# Patient Record
Sex: FEMALE | Race: WHITE | URBAN - METROPOLITAN AREA
[De-identification: names, ages, dates, MRNs, and addresses within clinical notes are randomized per-mention and may not be internally consistent; named-entity substitution may affect disease eponyms.]

---

## 2023-11-17 RX ORDER — ATORVASTATIN CALCIUM 10 MG/1
5 TABLET, FILM COATED ORAL DAILY
COMMUNITY
Start: 2023-07-25 | End: 2023-11-17

## 2023-11-17 RX ORDER — LEVOTHYROXINE SODIUM 0.05 MG/1
50 TABLET ORAL DAILY
Qty: 90 TABLET | Refills: 0 | Status: SHIPPED | OUTPATIENT
Start: 2023-11-17

## 2023-11-17 RX ORDER — ATORVASTATIN CALCIUM 10 MG/1
5 TABLET, FILM COATED ORAL DAILY
Qty: 45 TABLET | Refills: 0 | Status: SHIPPED | OUTPATIENT
Start: 2023-11-17

## 2023-11-17 NOTE — TELEPHONE ENCOUNTER
[facility] updated    LOV/MAWV 12/06/22  Last labs 12/06/22  Last refill on 07/25/23, for #45 each, with 0 refills  atorvastatin 10 MG Oral Tab    Last refill on 07/28/23, for #90 each, with 1 refills  Levothyroxine Sodium 50 MCG Oral Tab     Future Appointments   Date Time Provider Chris Estrada   12/14/2023 11:15 AM Shant Bass MD [facility]     Order(s) pending, please review. Thank you.   Kathrine Campoverde

## 2023-11-17 NOTE — TELEPHONE ENCOUNTER
[facility] [address] , [phone], [address] [address] Phone: [phone] Fax: [phone] Hours: Not open 24 hours     PAKTIENT REQUESTING REFILL FOR   LEVOTHYROXINE 50MCG every day #90  ATORVASTATIN 10MG 1/2 TAB #90    PATIENT IS SCHEDULED FOR HER SUPER VISIT WITH DR Sharma [facility] IN [place] 2023

## 2023-12-14 ENCOUNTER — OFFICE VISIT (OUTPATIENT)
Dept: FAMILY MEDICINE CLINIC | Facility: CLINIC | Age: 78
End: 2023-12-14
Payer: COMMERCIAL

## 2023-12-14 VITALS
HEART RATE: 61 BPM | WEIGHT: 166 LBS | HEIGHT: 62 IN | OXYGEN SATURATION: 98 % | DIASTOLIC BLOOD PRESSURE: 76 MMHG | SYSTOLIC BLOOD PRESSURE: 120 MMHG | BODY MASS INDEX: 30.55 KG/M2 | TEMPERATURE: 98 F

## 2023-12-14 DIAGNOSIS — Z00.00 MEDICARE ANNUAL WELLNESS VISIT, SUBSEQUENT: Primary | ICD-10-CM

## 2023-12-14 DIAGNOSIS — Z12.31 ENCOUNTER FOR SCREENING MAMMOGRAM FOR HIGH-RISK PATIENT: ICD-10-CM

## 2023-12-14 DIAGNOSIS — Z78.0 MENOPAUSE: ICD-10-CM

## 2023-12-14 DIAGNOSIS — M65.342 TRIGGER RING FINGER OF LEFT HAND: ICD-10-CM

## 2023-12-14 DIAGNOSIS — E07.9 THYROID DISEASE: ICD-10-CM

## 2023-12-14 PROBLEM — M19.90 ARTHRITIS: Status: ACTIVE | Noted: 2020-05-12

## 2023-12-14 PROCEDURE — 3008F BODY MASS INDEX DOCD: CPT | Performed by: FAMILY MEDICINE

## 2023-12-14 PROCEDURE — 1159F MED LIST DOCD IN RCRD: CPT | Performed by: FAMILY MEDICINE

## 2023-12-14 PROCEDURE — 3078F DIAST BP <80 MM HG: CPT | Performed by: FAMILY MEDICINE

## 2023-12-14 PROCEDURE — 1170F FXNL STATUS ASSESSED: CPT | Performed by: FAMILY MEDICINE

## 2023-12-14 PROCEDURE — 3074F SYST BP LT 130 MM HG: CPT | Performed by: FAMILY MEDICINE

## 2023-12-14 PROCEDURE — 1160F RVW MEDS BY RX/DR IN RCRD: CPT | Performed by: FAMILY MEDICINE

## 2023-12-14 PROCEDURE — 96160 PT-FOCUSED HLTH RISK ASSMT: CPT | Performed by: FAMILY MEDICINE

## 2023-12-14 PROCEDURE — 1125F AMNT PAIN NOTED PAIN PRSNT: CPT | Performed by: FAMILY MEDICINE

## 2023-12-14 PROCEDURE — G0439 PPPS, SUBSEQ VISIT: HCPCS | Performed by: FAMILY MEDICINE

## 2024-01-16 ENCOUNTER — HOSPITAL ENCOUNTER (OUTPATIENT)
Dept: BONE DENSITY | Age: 79
Discharge: HOME | End: 2024-01-16
Attending: FAMILY MEDICINE
Payer: MEDICARE

## 2024-01-16 ENCOUNTER — TELEPHONE (OUTPATIENT)
Dept: FAMILY MEDICINE CLINIC | Facility: CLINIC | Age: 79
End: 2024-01-16

## 2024-01-16 DIAGNOSIS — S39.012A BACK STRAIN, INITIAL ENCOUNTER: Primary | ICD-10-CM

## 2024-01-16 DIAGNOSIS — Z78.0 MENOPAUSE: ICD-10-CM

## 2024-01-16 PROCEDURE — 77080 DXA BONE DENSITY AXIAL: CPT | Performed by: FAMILY MEDICINE

## 2024-01-16 RX ORDER — CYCLOBENZAPRINE HCL 5 MG
5 TABLET ORAL 3 TIMES DAILY PRN
Qty: 30 TABLET | Refills: 0 | Status: SHIPPED | OUTPATIENT
Start: 2024-01-16

## 2024-01-16 NOTE — TELEPHONE ENCOUNTER
Pt reports shoveling snow few days ago - lower back pain worse  Had 10/10 pain when getting up from exam table from bone scan today  Otherwise pain score 4-5/10 generally  Back is tender  Pt c/o Slight pain to right lower abd    Pt states Heat pad and OTC tylenol does help a little  Was advised to avoid ibuprofen - hx ulcers    Pt has appt tomorrow  Future Appointments   Date Time Provider Department Center   1/17/2024  1:40 PM Devan Cabral MD [facility]     Advised pt will notify Dr. Villanueva  If with further recommendations, will call pt back - she v/u  No further questions at this time    Please advise, thank you

## 2024-01-16 NOTE — TELEPHONE ENCOUNTER
Pt reports shoveling about 3-4 days ago and her back pain got worse after shoveling. Pt had trouble getting off the exam table this AM for bone density scan    Scheduled for IO appt tomorrow; ok to waity or should pt take something?

## 2024-01-16 NOTE — TELEPHONE ENCOUNTER
Sending in a muscle relaxer and if no better in a few days I can send in a steroid pack. Advise pt not to drive when taking this med. Thank you

## 2024-01-16 NOTE — TELEPHONE ENCOUNTER
Advised patient of Dr. Villanueva's note below. Patient verbalized understanding.   Advised pt if symptoms improved, may cancel appt tomorrow - she v/u. No further questions at this time.

## 2024-01-17 ENCOUNTER — TELEPHONE (OUTPATIENT)
Dept: FAMILY MEDICINE CLINIC | Facility: CLINIC | Age: 79
End: 2024-01-17

## 2024-01-17 NOTE — TELEPHONE ENCOUNTER
Letter mailed to patient reminding them they have outstanding orders.  Lab Frequency Next Occurrence   CBC W Differential W Platelet [E] Once 12/15/2023   Comp Metabolic Panel (14) [E] Once 12/15/2023   Lipid Panel [E] Once 12/15/2023   TSH and Free T4 [E] Once 12/15/2023

## 2024-02-05 ENCOUNTER — TELEPHONE (OUTPATIENT)
Dept: FAMILY MEDICINE CLINIC | Facility: CLINIC | Age: 79
End: 2024-02-05

## 2024-02-05 NOTE — TELEPHONE ENCOUNTER
Pt received a letter she is due for labs. Dr Villanueva changed her labs to June, can she still wait?

## 2024-02-05 NOTE — TELEPHONE ENCOUNTER
Advised patient of Dr. Villanueva's note below. Patient verbalized understanding. No further questions at this time.

## 2024-02-12 RX ORDER — LEVOTHYROXINE SODIUM 0.05 MG/1
50 TABLET ORAL DAILY
Qty: 30 TABLET | Refills: 0 | Status: SHIPPED | OUTPATIENT
Start: 2024-02-12

## 2024-02-12 NOTE — TELEPHONE ENCOUNTER
Thyroid Medication Protocol [id number]/12/2024 11:31 AM   Protocol Details TSH in past 12 months    Last TSH value is normal    In person appointment or virtual visit in the past 12 mos or appointment in next 3 mos      Routing to provider per protocol.   levothyroxine 50 MCG Oral Tab   Last refilled on 11/17/23 for #90  with 0 rf.   Last labs 12/14/23.   Last seen on for physical 12/14/23.       Future Appointments   Date Time Provider Department Center   2/22/2024 10:45 AM Quinten Cabrales MD [facility]   6/3/2024 11:00 AM [facility] NURSE [facility] [place]   6/3/2024 11:20 AM DELFINO HAY [facility]          Thank you.

## 2024-02-13 RX ORDER — LEVOTHYROXINE SODIUM 0.05 MG/1
50 TABLET ORAL DAILY
Qty: 90 TABLET | Refills: 0 | OUTPATIENT
Start: 2024-02-13

## 2024-02-15 NOTE — TELEPHONE ENCOUNTER
Pt failed refill protocol for the following reasons:   Name from pharmacy: ATORVASTATIN 10MG TABLETS         Will file in chart as: ATORVASTATIN 10 MG Oral Tab    Sig: TAKE 1/2 TABLET(5 MG) BY MOUTH DAILY    Disp: 45 tablet    Refills: 0 (Pharmacy requested: Not specified)    Start: 2/15/2024    Class: Normal    Non-formulary    Last ordered: 3 months ago (11/17/2023) by Devan Cabral MD    Last refill: 11/17/2023    Rx #: [id number]    Cholesterol Medication Protocol Yuvpdt62/15/2024 12:02 PM   Protocol Details ALT < 80    ALT resulted within past year    Lipid panel within past 12 months    In person appointment or virtual visit in the past 12 mos or appointment in next 3 mos      [place] be [facility] at: [id number] [place] [place] [facility] [address] [address] [facility] AT [facility] [address], [phone], [phone]         Last refill: 11/17/23  Last appt: 12/14/23  Next appt:   Future Appointments   Date Time Provider Department Center   2/22/2024 10:45 AM Quinten Cabrales MD [facility]   6/3/2024 11:00 AM RENATA MORE NURSE [facility] [place]   6/3/2024 11:20 AM DELFINO HAY [facility]         Forward to Dr. Villanueva, please advise on refills. Thank you.

## 2024-02-16 RX ORDER — ATORVASTATIN CALCIUM 10 MG/1
5 TABLET, FILM COATED ORAL DAILY
Qty: 45 TABLET | Refills: 0 | Status: SHIPPED | OUTPATIENT
Start: 2024-02-16

## 2024-02-22 ENCOUNTER — OFFICE VISIT (OUTPATIENT)
Dept: ORTHOPEDICS CLINIC | Facility: CLINIC | Age: 79
End: 2024-02-22
Payer: COMMERCIAL

## 2024-02-22 VITALS — BODY MASS INDEX: 30.55 KG/M2 | WEIGHT: 166 LBS | HEIGHT: 62 IN

## 2024-02-22 DIAGNOSIS — M65.342 TRIGGER RING FINGER OF LEFT HAND: Primary | ICD-10-CM

## 2024-02-22 PROCEDURE — 1159F MED LIST DOCD IN RCRD: CPT | Performed by: ORTHOPAEDIC SURGERY

## 2024-02-22 PROCEDURE — 99203 OFFICE O/P NEW LOW 30 MIN: CPT | Performed by: ORTHOPAEDIC SURGERY

## 2024-02-22 PROCEDURE — 3008F BODY MASS INDEX DOCD: CPT | Performed by: ORTHOPAEDIC SURGERY

## 2024-02-22 PROCEDURE — 1160F RVW MEDS BY RX/DR IN RCRD: CPT | Performed by: ORTHOPAEDIC SURGERY

## 2024-02-22 PROCEDURE — 1126F AMNT PAIN NOTED NONE PRSNT: CPT | Performed by: ORTHOPAEDIC SURGERY

## 2024-02-22 RX ORDER — BETAMETHASONE SODIUM PHOSPHATE AND BETAMETHASONE ACETATE 3; 3 MG/ML; MG/ML
6 INJECTION, SUSPENSION INTRA-ARTICULAR; INTRALESIONAL; INTRAMUSCULAR; SOFT TISSUE ONCE
Status: DISCONTINUED | OUTPATIENT
Start: 2024-02-22 | End: 2024-02-22

## 2024-02-22 NOTE — H&P
Clinic Note     Assessment/Plan:  78 year old with   Triggering of left ring finger.  I discussed with the patient various treatment options and their success rate/risks.  We using a shared decision-making process decided to proceed with a corticosteroid injection in 2 weeks from today's date. If after the corticosteroid injection the patient's symptoms are completely resolved, patient can cancel the appointment and follow-up on an as-needed basis.    Physical Exam:    Ht 5' 2\" (1.575 m)   Wt 166 lb (75.3 kg)   BMI 30.36 kg/m²     Constitutional: NAD. AOx3. Well-developed and Well-nourished.   Psychiatric: Normal mood/ affect/ behavior. Judgment and thought content normal.     Left upper Extremity:   Inspection: Skin Intact. No skin lesions. No gross deformity.   Palpation:  (+) TTP over A1 pulley   Motion: Elbow: normal bilateral symmetric ext/flex  Wrist: normal bilateral symmetric ext/flex/sup/pro  Finger: full composite fist,    Special Tests: (+) Slight triggering. (-) PIPJ contracture. Normally sensate digit. Normally perfused digit.       CC: Triggering of left ring finger    HPI: 78 year old right-hand-dominant female presents with triggering of left ring finger. It started 1 month ago. It has failed to improve/resolve. Pain level is none. Pain is described as locking. Patient has tried nothing.     (+) pain at A1 Pulley  (+) active triggering    Past Medical History:   Diagnosis Date    Anal stricture     Arthritis     Family history of colon cancer     Sister    GERD with esophagitis     Hiatal hernia     History of hemorrhoids     History of pneumonia     Hyperlipidemia     Hypothyroidism     Pruritus ani      Past Surgical History:   Procedure Laterality Date    ANAL SPHINCTEROTOMY  2002          x2    COLONOSCOPY  2002    COLONOSCOPY  2012    Mild anal stricture and hemorrhoids    COLONOSCOPY  2016    Normal colon    EGD  2016    Antral gastropathy with  erosive gastritis, mild GERD with esophagitis, large hiatal hernia    EGD  05/11/2016    Complete healing of the antrum, large hiatal hernia    FOOT SURGERY  2013    HYSTERECTOMY      REMOVAL OF OVARY(S) Bilateral      Current Outpatient Medications   Medication Sig Dispense Refill    atorvastatin 10 MG Oral Tab Take 0.5 tablets (5 mg total) by mouth daily. 45 tablet 0    levothyroxine 50 MCG Oral Tab Take 1 tablet (50 mcg total) by mouth daily. 30 tablet 0    IRON OR       Probiotic Product (PROBIOTIC DAILY OR) Take by mouth.      Clobetasol Propionate 0.05 % External Cream Apply sparingly to affected area BID  PRN 45 g 1    omega-3 fatty acids 1000 MG Oral Cap       nystatin 403880 UNIT/GM External Ointment MARGARITO EXT AA BID  1    Cholecalciferol (VITAMIN D3 SUPER STRENGTH) 2000 units Oral Tab       Omeprazole 20 MG Oral Tab EC Take 20 mg by mouth daily.        cyclobenzaprine 5 MG Oral Tab Take 1 tablet (5 mg total) by mouth 3 (three) times daily as needed for Muscle spasms. 30 tablet 0     Allergies   Allergen Reactions    Penicillin G HIVES    Penicillins HIVES     Family History   Problem Relation Age of Onset    Diabetes Other         maternal uncle    Uterine Cancer Mother 94    Colon Cancer Sister 42    Cancer Sister 30        Liver     Social History     Occupational History    Not on file   Tobacco Use    Smoking status: Never    Smokeless tobacco: Never   Vaping Use    Vaping Use: Never used   Substance and Sexual Activity    Alcohol use: No    Drug use: No    Sexual activity: Not on file        Review of Systems (negative unless bolded):  General: fevers, chills, fatigue  CV:  chest pain, palpitations, leg swelling  Msk: bodyaches, neck pain, neck stiffness  Skin: rashes, open wounds, nonhealing ulcers  Hem: bleeds easily, bruise easily, immunocompromised  Neuro: dizziness, light headedness, headaches  Psych: anxious, depressed, anger issues    Attention: This note has been scribed by Jenelle Allen under  the supervision of Edwardo Cabrales MD.     Edwardo Cabrales MD   Hand, Wrist, & Elbow Surgery  [email or web address]  t: [phone]  f: [phone]

## 2024-03-07 ENCOUNTER — OFFICE VISIT (OUTPATIENT)
Dept: ORTHOPEDICS CLINIC | Facility: CLINIC | Age: 79
End: 2024-03-07
Payer: COMMERCIAL

## 2024-03-07 VITALS — BODY MASS INDEX: 30.55 KG/M2 | HEIGHT: 62 IN | WEIGHT: 166 LBS

## 2024-03-07 DIAGNOSIS — M65.342 TRIGGER RING FINGER OF LEFT HAND: Primary | ICD-10-CM

## 2024-03-07 PROCEDURE — 20550 NJX 1 TENDON SHEATH/LIGAMENT: CPT | Performed by: ORTHOPAEDIC SURGERY

## 2024-03-07 PROCEDURE — 1159F MED LIST DOCD IN RCRD: CPT | Performed by: ORTHOPAEDIC SURGERY

## 2024-03-07 PROCEDURE — 3008F BODY MASS INDEX DOCD: CPT | Performed by: ORTHOPAEDIC SURGERY

## 2024-03-07 PROCEDURE — 1160F RVW MEDS BY RX/DR IN RCRD: CPT | Performed by: ORTHOPAEDIC SURGERY

## 2024-03-07 PROCEDURE — 1126F AMNT PAIN NOTED NONE PRSNT: CPT | Performed by: ORTHOPAEDIC SURGERY

## 2024-03-07 RX ORDER — BETAMETHASONE SODIUM PHOSPHATE AND BETAMETHASONE ACETATE 3; 3 MG/ML; MG/ML
6 INJECTION, SUSPENSION INTRA-ARTICULAR; INTRALESIONAL; INTRAMUSCULAR; SOFT TISSUE ONCE
Status: COMPLETED | OUTPATIENT
Start: 2024-03-07 | End: 2024-03-07

## 2024-03-07 RX ADMIN — BETAMETHASONE SODIUM PHOSPHATE AND BETAMETHASONE ACETATE 6 MG: 3; 3 INJECTION, SUSPENSION INTRA-ARTICULAR; INTRALESIONAL; INTRAMUSCULAR; SOFT TISSUE at 10:57:00

## 2024-03-07 NOTE — PROGRESS NOTES
Injection:    Written consent was obtained.  The skin was prepped with alcohol.  Ethyl chloride spray was used anesthetize the superficial skin.  A 25-gauge needle was used to inject 1.5 mL mixture of 1 mL of 6 mg of betamethasone and 1 mL of 1% lidocaine into left  ring finger A1 pulley.  Hemostasis achieved.  Band-Aid was applied.  Patient tolerated procedure without complication.

## 2024-03-18 ENCOUNTER — TELEPHONE (OUTPATIENT)
Dept: FAMILY MEDICINE CLINIC | Facility: CLINIC | Age: 79
End: 2024-03-18

## 2024-03-18 RX ORDER — LEVOTHYROXINE SODIUM 0.05 MG/1
50 TABLET ORAL DAILY
Qty: 90 TABLET | Refills: 0 | Status: SHIPPED | OUTPATIENT
Start: 2024-03-18

## 2024-03-18 NOTE — TELEPHONE ENCOUNTER
Last OV:12/14/2023  Last refill:02/12/2024 30 tabs, 0 refill     Last TSH 12/06/2022    Medication pended, please sign if appropriate

## 2024-03-18 NOTE — TELEPHONE ENCOUNTER
Pt states she normally gets 90 day prescription. Last time she only got 30 days so doesn't want a \"refill\".  States she wants 90 days because of insurance.    levothyroxine 50 MCG Oral Tab [[id number]] (Order [id number]     Please send to:  [id number] - [place], [place] - [address] AT [place] [address], [phone], [phone] [address] [facility] [address]   Phone: [phone] Fax: [phone]   Thank you!

## 2024-04-25 ENCOUNTER — MED REC SCAN ONLY (OUTPATIENT)
Dept: FAMILY MEDICINE CLINIC | Facility: CLINIC | Age: 79
End: 2024-04-25

## 2024-06-03 ENCOUNTER — NURSE ONLY (OUTPATIENT)
Dept: FAMILY MEDICINE CLINIC | Facility: CLINIC | Age: 79
End: 2024-06-03
Payer: COMMERCIAL

## 2024-06-03 ENCOUNTER — HOSPITAL ENCOUNTER (OUTPATIENT)
Dept: MAMMOGRAPHY | Age: 79
Discharge: HOME | End: 2024-06-03
Attending: FAMILY MEDICINE
Payer: MEDICARE

## 2024-06-03 DIAGNOSIS — Z12.31 ENCOUNTER FOR SCREENING MAMMOGRAM FOR HIGH-RISK PATIENT: ICD-10-CM

## 2024-06-03 DIAGNOSIS — Z00.00 MEDICARE ANNUAL WELLNESS VISIT, SUBSEQUENT: ICD-10-CM

## 2024-06-03 DIAGNOSIS — E07.9 THYROID DISEASE: ICD-10-CM

## 2024-06-03 LAB
ALBUMIN SERPL-MCNC: 3.8 G/DL (ref 3.4–5)
ALBUMIN/GLOB SERPL: 1.1 {RATIO} (ref 1–2)
ALP LIVER SERPL-CCNC: 116 U/L
ALT SERPL-CCNC: 18 U/L
ANION GAP SERPL CALC-SCNC: 6 MMOL/L (ref 0–18)
AST SERPL-CCNC: 19 U/L (ref 15–37)
BASOPHILS # BLD AUTO: 0.06 X10(3) UL (ref 0–0.2)
BASOPHILS NFR BLD AUTO: 1.1 %
BILIRUB SERPL-MCNC: 0.8 MG/DL (ref 0.1–2)
BUN BLD-MCNC: 11 MG/DL (ref 9–23)
CALCIUM BLD-MCNC: 9.5 MG/DL (ref 8.5–10.1)
CHLORIDE SERPL-SCNC: 108 MMOL/L (ref 98–112)
CHOLEST SERPL-MCNC: 182 MG/DL (ref ?–200)
CO2 SERPL-SCNC: 26 MMOL/L (ref 21–32)
CREAT BLD-MCNC: 0.87 MG/DL
EGFRCR SERPLBLD CKD-EPI 2021: 69 ML/MIN/1.73M2 (ref 60–?)
EOSINOPHIL # BLD AUTO: 0.11 X10(3) UL (ref 0–0.7)
EOSINOPHIL NFR BLD AUTO: 1.9 %
ERYTHROCYTE [DISTWIDTH] IN BLOOD BY AUTOMATED COUNT: 13.3 %
FASTING PATIENT LIPID ANSWER: YES
FASTING STATUS PATIENT QL REPORTED: YES
GLOBULIN PLAS-MCNC: 3.4 G/DL (ref 2.8–4.4)
GLUCOSE BLD-MCNC: 93 MG/DL (ref 70–99)
HCT VFR BLD AUTO: 42.9 %
HDLC SERPL-MCNC: 53 MG/DL (ref 40–59)
HGB BLD-MCNC: 14.1 G/DL
IMM GRANULOCYTES # BLD AUTO: 0.01 X10(3) UL (ref 0–1)
IMM GRANULOCYTES NFR BLD: 0.2 %
LDLC SERPL CALC-MCNC: 107 MG/DL (ref ?–100)
LYMPHOCYTES # BLD AUTO: 1.36 X10(3) UL (ref 1–4)
LYMPHOCYTES NFR BLD AUTO: 24 %
MCH RBC QN AUTO: 31.3 PG (ref 26–34)
MCHC RBC AUTO-ENTMCNC: 32.9 G/DL (ref 31–37)
MCV RBC AUTO: 95.1 FL
MONOCYTES # BLD AUTO: 0.5 X10(3) UL (ref 0.1–1)
MONOCYTES NFR BLD AUTO: 8.8 %
NEUTROPHILS # BLD AUTO: 3.62 X10 (3) UL (ref 1.5–7.7)
NEUTROPHILS # BLD AUTO: 3.62 X10(3) UL (ref 1.5–7.7)
NEUTROPHILS NFR BLD AUTO: 64 %
NONHDLC SERPL-MCNC: 129 MG/DL (ref ?–130)
OSMOLALITY SERPL CALC.SUM OF ELEC: 289 MOSM/KG (ref 275–295)
PLATELET # BLD AUTO: 211 10(3)UL (ref 150–450)
POTASSIUM SERPL-SCNC: 4.3 MMOL/L (ref 3.5–5.1)
PROT SERPL-MCNC: 7.2 G/DL (ref 6.4–8.2)
RBC # BLD AUTO: 4.51 X10(6)UL
SODIUM SERPL-SCNC: 140 MMOL/L (ref 136–145)
T4 FREE SERPL-MCNC: 1 NG/DL (ref 0.8–1.7)
TRIGL SERPL-MCNC: 123 MG/DL (ref 30–149)
TSI SER-ACNC: 1.64 MIU/ML (ref 0.36–3.74)
VLDLC SERPL CALC-MCNC: 21 MG/DL (ref 0–30)
WBC # BLD AUTO: 5.7 X10(3) UL (ref 4–11)

## 2024-06-03 PROCEDURE — 77067 SCR MAMMO BI INCL CAD: CPT | Performed by: FAMILY MEDICINE

## 2024-06-03 PROCEDURE — 84439 ASSAY OF FREE THYROXINE: CPT | Performed by: FAMILY MEDICINE

## 2024-06-03 PROCEDURE — 85025 COMPLETE CBC W/AUTO DIFF WBC: CPT | Performed by: FAMILY MEDICINE

## 2024-06-03 PROCEDURE — 77063 BREAST TOMOSYNTHESIS BI: CPT | Performed by: FAMILY MEDICINE

## 2024-06-03 PROCEDURE — 80061 LIPID PANEL: CPT | Performed by: FAMILY MEDICINE

## 2024-06-03 PROCEDURE — 84443 ASSAY THYROID STIM HORMONE: CPT | Performed by: FAMILY MEDICINE

## 2024-06-03 PROCEDURE — 80053 COMPREHEN METABOLIC PANEL: CPT | Performed by: FAMILY MEDICINE

## 2024-06-03 NOTE — PROGRESS NOTES
Samantha Mary present in office for nurse visit.  Labs as ordered by Dr. Villanueva; 22 g, Right AC, lavender tube, and green tube     All questions/concerns addressed. Patient left in stable condition.

## 2024-06-07 RX ORDER — LEVOTHYROXINE SODIUM 0.05 MG/1
50 TABLET ORAL DAILY
Qty: 90 TABLET | Refills: 0 | Status: SHIPPED | OUTPATIENT
Start: 2024-06-07

## 2024-06-07 NOTE — TELEPHONE ENCOUNTER
Thyroid Medication Protocol Fxeeza5606/07/2024 01:04 PM   Protocol Details TSH in past 12 months    Last TSH value is normal    In person appointment or virtual visit in the past 12 mos or appointment in next 3 mos      Refilled per protocol  levothyroxine 50 MCG Oral Tab   Last refilled on 3/18/24 #90 with 0 rf.  LOV- 12/14/23  Last labs- 6/3/24    Sent to pharmacy

## 2024-06-18 ENCOUNTER — HOSPITAL ENCOUNTER (OUTPATIENT)
Dept: GENERAL RADIOLOGY | Age: 79
Discharge: HOME | End: 2024-06-18
Attending: FAMILY MEDICINE

## 2024-06-18 ENCOUNTER — OFFICE VISIT (OUTPATIENT)
Dept: FAMILY MEDICINE CLINIC | Facility: CLINIC | Age: 79
End: 2024-06-18

## 2024-06-18 VITALS
DIASTOLIC BLOOD PRESSURE: 82 MMHG | RESPIRATION RATE: 22 BRPM | OXYGEN SATURATION: 98 % | BODY MASS INDEX: 31 KG/M2 | SYSTOLIC BLOOD PRESSURE: 128 MMHG | WEIGHT: 169.19 LBS | TEMPERATURE: 98 F | HEART RATE: 101 BPM

## 2024-06-18 DIAGNOSIS — R20.2 PARESTHESIA OF SKIN: ICD-10-CM

## 2024-06-18 DIAGNOSIS — S63.633A SPRAIN OF INTERPHALANGEAL JOINT OF LEFT MIDDLE FINGER, INITIAL ENCOUNTER: ICD-10-CM

## 2024-06-18 DIAGNOSIS — K13.0 LIP LESION: ICD-10-CM

## 2024-06-18 DIAGNOSIS — M54.2 NECK PAIN: ICD-10-CM

## 2024-06-18 DIAGNOSIS — M54.2 NECK PAIN: Primary | ICD-10-CM

## 2024-06-18 DIAGNOSIS — Z13.6 SCREENING, ISCHEMIC HEART DISEASE: ICD-10-CM

## 2024-06-18 PROCEDURE — 3074F SYST BP LT 130 MM HG: CPT | Performed by: FAMILY MEDICINE

## 2024-06-18 PROCEDURE — 1159F MED LIST DOCD IN RCRD: CPT | Performed by: FAMILY MEDICINE

## 2024-06-18 PROCEDURE — 1160F RVW MEDS BY RX/DR IN RCRD: CPT | Performed by: FAMILY MEDICINE

## 2024-06-18 PROCEDURE — 72052 X-RAY EXAM NECK SPINE 6/>VWS: CPT | Performed by: FAMILY MEDICINE

## 2024-06-18 PROCEDURE — 99214 OFFICE O/P EST MOD 30 MIN: CPT | Performed by: FAMILY MEDICINE

## 2024-06-18 PROCEDURE — 3079F DIAST BP 80-89 MM HG: CPT | Performed by: FAMILY MEDICINE

## 2024-06-18 RX ORDER — OMEPRAZOLE 40 MG/1
40 CAPSULE, DELAYED RELEASE ORAL DAILY
Qty: 90 CAPSULE | Refills: 0 | Status: SHIPPED | OUTPATIENT
Start: 2024-06-18 | End: 2025-06-13

## 2024-06-18 NOTE — PROGRESS NOTES
Chief Complaint   Patient presents with    Neck Pain     Stiffness and numbness, neck keeps cracking when Pt rotates head    Shortness Of Breath         HPI  Neck pain and cracking with moving neck. She feels some numbness and stiffness in her neck. She is getting SOB but is feel congested and thinks it maybe related to her weight and noticed it 2-3 months ago.    A few days ago pt left middle finger popped and she is having some pain in her DIPJ.     Needle hit her left lip one month ago and pt feels something is there and wants it looked at and removed if possible. She has been \"messing with\"      ROS  As per HPI and all other systems reviewed and are negative      Past Medical History:    Anal stricture    Arthritis    Family history of colon cancer    Sister    GERD with esophagitis    Hiatal hernia    History of hemorrhoids    History of pneumonia    Hyperlipidemia    Hypothyroidism    Pruritus ani       Past Surgical History:   Procedure Laterality Date    Anal sphincterotomy  2002          x2    Colonoscopy  2002    Colonoscopy  2012    Mild anal stricture and hemorrhoids    Colonoscopy  2016    Normal colon    Egd  2016    Antral gastropathy with erosive gastritis, mild GERD with esophagitis, large hiatal hernia    Egd  2016    Complete healing of the antrum, large hiatal hernia    Foot surgery  2013    Hysterectomy      Oophorectomy      Removal of ovary(s) Bilateral        Social History     Socioeconomic History    Marital status:    Tobacco Use    Smoking status: Never    Smokeless tobacco: Never   Vaping Use    Vaping status: Never Used   Substance and Sexual Activity    Alcohol use: No    Drug use: No       Family History   Problem Relation Age of Onset    Ovarian Cancer Mother 40        30s or 40s    Uterine Cancer Mother 94    Colon Cancer Sister 42    Cancer Sister 30        Liver    Diabetes Other         maternal uncle        Current Outpatient  Medications on File Prior to Visit   Medication Sig Dispense Refill    LEVOTHYROXINE 50 MCG Oral Tab TAKE 1 TABLET(50 MCG) BY MOUTH DAILY 90 tablet 0    atorvastatin 10 MG Oral Tab Take 0.5 tablets (5 mg total) by mouth daily. 45 tablet 0    IRON OR       Probiotic Product (PROBIOTIC DAILY OR) Take by mouth.      Clobetasol Propionate 0.05 % External Cream Apply sparingly to affected area BID  PRN 45 g 1    omega-3 fatty acids 1000 MG Oral Cap       nystatin 247568 UNIT/GM External Ointment MARGARITO EXT AA BID  1    Cholecalciferol (VITAMIN D3 SUPER STRENGTH) 2000 units Oral Tab        No current facility-administered medications on file prior to visit.         Objective  Vitals:    06/18/24 1344   BP: 128/82   Pulse: 101   Resp: 22   Temp: 98.1 °F (36.7 °C)   SpO2: 98%   Weight: 169 lb 3.2 oz (76.7 kg)     Physical Exam  Constitutional:       Appearance: Normal appearance.   HEENT:      Head: Normocephalic and atraumatic.      Eyes: PERRLA no notable nystagmus     Ears: normal on observation     Nose: Nose normal.      Mouth: Mucous membranes are moist.      Neck: no masses no bruit  Cardiovascular:      Rate and Rhythm: Normal rate and regular rhythm.   Pulmonary:      Effort: Pulmonary effort is normal.      Breath sounds: Normal breath sounds.   Musculoskeletal:         General: Normal range of motion. Pt hs some swelling of left middle DIPJ with normal ROM advised splint ice and elevation     Cervical back: drom of side to side. Pt has kyphosis and discomfort worse with extension  Skin:     General: Skin is warm and dry. Linear erythema on upper philtrum with slight tenderness but no notable FB  Neurological:      General: No focal deficit present.      Mental Status: She is alert and oriented to person, place, and time.   Psychiatric:         Mood and Affect: Mood normal.         Thought Content: Thought content normal.       Assessment and Plan  Samantha was seen today for neck pain and shortness of  breath.    Diagnoses and all orders for this visit:    Neck pain  -     XR CERVICAL SPINE COMPLETE W/FLEX + EXT (CPT=72052); Future    Paresthesia of skin  -     XR CERVICAL SPINE COMPLETE W/FLEX + EXT (CPT=72052); Future    Screening, ischemic heart disease  -     CT CALCIUM SCORING; Future    Sprain of interphalangeal joint of left middle finger, initial encounter   RICE and if no better in one week will refer to Dr Cabrales  Lip lesion  Neosporin BID for a week if no better will get xray and refer to derm    Other orders  -     Omeprazole 40 MG Oral Capsule Delayed Release; Take 1 capsule (40 mg total) by mouth daily.           Follow up  No follow-ups on file.      Patient Instructions  Patient Instructions   I'd like for you to see an orthopedic spine doctor the choices I can give you are Dr Miguel, Dr Morataya, Dr Javed and Dr Pearl Cabral MD

## 2024-06-18 NOTE — PATIENT INSTRUCTIONS
I'd like for you to see an orthopedic spine doctor the choices I can give you are Dr Miguel, Dr Morataya, Dr Javed and Dr Kang

## 2024-06-29 ENCOUNTER — HOSPITAL ENCOUNTER (OUTPATIENT)
Dept: CT IMAGING | Age: 79
Discharge: HOME | End: 2024-06-29
Attending: FAMILY MEDICINE

## 2024-06-29 DIAGNOSIS — Z13.6 SCREENING, ISCHEMIC HEART DISEASE: ICD-10-CM

## 2024-06-29 DIAGNOSIS — Z13.9 ENCOUNTER FOR SCREENING: ICD-10-CM

## 2024-08-01 ENCOUNTER — OFFICE VISIT (OUTPATIENT)
Dept: FAMILY MEDICINE CLINIC | Facility: CLINIC | Age: 79
End: 2024-08-01
Payer: COMMERCIAL

## 2024-08-01 VITALS
RESPIRATION RATE: 18 BRPM | SYSTOLIC BLOOD PRESSURE: 120 MMHG | TEMPERATURE: 98 F | HEART RATE: 78 BPM | BODY MASS INDEX: 30.36 KG/M2 | HEIGHT: 62 IN | WEIGHT: 165 LBS | DIASTOLIC BLOOD PRESSURE: 68 MMHG | OXYGEN SATURATION: 98 %

## 2024-08-01 DIAGNOSIS — L03.116 CELLULITIS OF LEFT LOWER EXTREMITY: Primary | ICD-10-CM

## 2024-08-01 DIAGNOSIS — L23.7 ALLERGIC CONTACT DERMATITIS DUE TO PLANTS, EXCEPT FOOD: ICD-10-CM
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
## 2024-08-01 DIAGNOSIS — S80.862S INSECT BITE OF LEFT LOWER LEG, SEQUELA: ICD-10-CM

## 2024-08-01 PROCEDURE — 3008F BODY MASS INDEX DOCD: CPT | Performed by: FAMILY MEDICINE

## 2024-08-01 PROCEDURE — 3078F DIAST BP <80 MM HG: CPT | Performed by: FAMILY MEDICINE

## 2024-08-01 PROCEDURE — 3074F SYST BP LT 130 MM HG: CPT | Performed by: FAMILY MEDICINE

## 2024-08-01 PROCEDURE — 99214 OFFICE O/P EST MOD 30 MIN: CPT | Performed by: FAMILY MEDICINE

## 2024-08-01 PROCEDURE — 1170F FXNL STATUS ASSESSED: CPT | Performed by: FAMILY MEDICINE

## 2024-08-01 PROCEDURE — 1159F MED LIST DOCD IN RCRD: CPT | Performed by: FAMILY MEDICINE

## 2024-08-01 PROCEDURE — 1160F RVW MEDS BY RX/DR IN RCRD: CPT | Performed by: FAMILY MEDICINE

## 2024-08-01 RX ORDER — SULFAMETHOXAZOLE AND TRIMETHOPRIM 800; 160 MG/1; MG/1
1 TABLET ORAL 2 TIMES DAILY
Qty: 14 TABLET | Refills: 0 | Status: SHIPPED | OUTPATIENT
Start: 2024-08-01

## 2024-08-01 RX ORDER — TRIAMCINOLONE ACETONIDE 1 MG/G
CREAM TOPICAL 3 TIMES DAILY
Qty: 60 G | Refills: 3 | Status: SHIPPED | OUTPATIENT
Start: 2024-08-01

## 2024-08-01 RX ORDER — DOXYCYCLINE HYCLATE 100 MG/1
100 CAPSULE ORAL 2 TIMES DAILY
COMMUNITY
Start: 2024-07-28

## 2024-08-01 RX ORDER — METHYLPREDNISOLONE 4 MG/1
4 TABLET ORAL AS DIRECTED
COMMUNITY
Start: 2024-07-28

## 2024-08-01 NOTE — PROGRESS NOTES
Chief Complaint   Patient presents with    Urgent Care F/u     Poison ivy and 2 insect bites infected - f/u went to Urgent Care in [facility]  Pt was helping her elderly neighbor clean up her yard and received insect bites and appears to have touched some poison ivy or oak and subsequently she developed a significant rash and the bits became swollen and infected and she went to urgent care and was given doxy and medrol pack. She is doing better and swelling around bite is less but her rash is till itch and has not improved significantly. She has a few more days with both meds    ROS  As per HPI and all other systems reviewed and are negative      Past Medical History:    Anal stricture    Arthritis    Family history of colon cancer    Sister    GERD with esophagitis    Hiatal hernia    History of hemorrhoids    History of pneumonia    Hyperlipidemia    Hypothyroidism    Pruritus ani       Past Surgical History:   Procedure Laterality Date    Anal sphincterotomy  2002          x2    Colonoscopy  2002    Colonoscopy  2012    Mild anal stricture and hemorrhoids    Colonoscopy  2016    Normal colon    Egd  2016    Antral gastropathy with erosive gastritis, mild GERD with esophagitis, large hiatal hernia    Egd  2016    Complete healing of the antrum, large hiatal hernia    Foot surgery  2013    Hysterectomy      Oophorectomy      Removal of ovary(s) Bilateral        Social History     Socioeconomic History    Marital status:    Tobacco Use    Smoking status: Never    Smokeless tobacco: Never   Vaping Use    Vaping status: Never Used   Substance and Sexual Activity    Alcohol use: No    Drug use: No       Family History   Problem Relation Age of Onset    Ovarian Cancer Mother 40        30s or 40s    Uterine Cancer Mother 94    Colon Cancer Sister 42    Cancer Sister 30        Liver    Diabetes Other         maternal uncle        Current Outpatient  Medications on File Prior to Visit   Medication Sig Dispense Refill    methylPREDNISolone 4 MG Oral Tablet Therapy Pack Take 1 tablet (4 mg total) by mouth As Directed. FOLLOW DIRECTIONS ON PACKAGING      doxycycline 100 MG Oral Cap Take 1 capsule (100 mg total) by mouth 2 (two) times daily.      LEVOTHYROXINE 50 MCG Oral Tab TAKE 1 TABLET(50 MCG) BY MOUTH DAILY 90 tablet 0    atorvastatin 10 MG Oral Tab Take 0.5 tablets (5 mg total) by mouth daily. 45 tablet 0    IRON OR       Probiotic Product (PROBIOTIC DAILY OR) Take by mouth.      Clobetasol Propionate 0.05 % External Cream Apply sparingly to affected area BID  PRN 45 g 1    omega-3 fatty acids 1000 MG Oral Cap       nystatin 667341 UNIT/GM External Ointment MARGARITO EXT AA BID  1    Cholecalciferol (VITAMIN D3 SUPER STRENGTH) 2000 units Oral Tab        No current facility-administered medications on file prior to visit.         Objective  Vitals:    08/01/24 0801   BP: 120/68   Pulse: 78   Resp: 18   Temp: 98.2 °F (36.8 °C)   TempSrc: Temporal   SpO2: 98%   Weight: 165 lb (74.8 kg)   Height: 5' 2\" (1.575 m)     Physical Exam  Constitutional:       Appearance: Normal appearance.   HEENT:      Head: Normocephalic and atraumatic.      Eyes: PERRLA no notable nystagmus     Ears: normal on observation     Nose: Nose normal.      Mouth: Mucous membranes are moist.      Neck: no masses no bruit  Musculoskeletal:         General: Normal range of motion.      Cervical back: Normal range of motion.   Skin:     General: Skin is warm and dry. Necrotic bite on posterior left lower leg with surround erythema and several areas on anterior left and right legs with erythematosus rash that is slightly raised and pruritic and a few lesion on her upper abdomen  Neurological:      General: No focal deficit present.      Mental Status: She is alert and oriented to person, place, and time.   Psychiatric:         Mood and Affect: Mood normal.         Thought Content: Thought content  normal.       Assessment and Plan  Samantha was seen today for urgent care f/u.    Diagnoses and all orders for this visit:    Cellulitis of left lower extremity  -     sulfamethoxazole-trimethoprim DS (BACTRIM DS) 800-160 MG Oral Tab per tablet; Take 1 tablet by mouth 2 (two) times daily.    Allergic contact dermatitis due to plants, except food  -     triamcinolone 0.1 % External Cream; Apply topically 3 (three) times daily.    Insect bite of left lower leg, sequela           Follow up  No follow-ups on file.      Patient Instructions  Patient Instructions   Claritin 10mg in am and pediatric benadryl at night and use the topical steroid 3 times a day and continue for 3 days after rash is gone but no longer than 14 days       Devan Cabral MD

## 2024-08-01 NOTE — PATIENT INSTRUCTIONS
Claritin 10mg in am and pediatric benadryl at night and use the topical steroid 3 times a day and continue for 3 days after rash is gone but no longer than 14 days

## 2024-08-06 ENCOUNTER — TELEPHONE (OUTPATIENT)
Dept: FAMILY MEDICINE CLINIC | Facility: CLINIC | Age: 79
End: 2024-08-06

## 2024-08-06 ENCOUNTER — OFFICE VISIT (OUTPATIENT)
Dept: FAMILY MEDICINE CLINIC | Facility: CLINIC | Age: 79
End: 2024-08-06
Payer: COMMERCIAL

## 2024-08-06 VITALS
HEART RATE: 70 BPM | WEIGHT: 165.38 LBS | BODY MASS INDEX: 30 KG/M2 | RESPIRATION RATE: 18 BRPM | DIASTOLIC BLOOD PRESSURE: 78 MMHG | OXYGEN SATURATION: 97 % | TEMPERATURE: 98 F | SYSTOLIC BLOOD PRESSURE: 112 MMHG

## 2024-08-06 DIAGNOSIS — L24.7 IRRITANT CONTACT DERMATITIS DUE TO PLANTS, EXCEPT FOOD: Primary | ICD-10-CM

## 2024-08-06 PROCEDURE — 3074F SYST BP LT 130 MM HG: CPT | Performed by: FAMILY MEDICINE

## 2024-08-06 PROCEDURE — 1159F MED LIST DOCD IN RCRD: CPT | Performed by: FAMILY MEDICINE

## 2024-08-06 PROCEDURE — 3078F DIAST BP <80 MM HG: CPT | Performed by: FAMILY MEDICINE

## 2024-08-06 PROCEDURE — 1160F RVW MEDS BY RX/DR IN RCRD: CPT | Performed by: FAMILY MEDICINE

## 2024-08-06 PROCEDURE — 99213 OFFICE O/P EST LOW 20 MIN: CPT | Performed by: FAMILY MEDICINE

## 2024-08-06 RX ORDER — PREDNISONE 10 MG/1
TABLET ORAL
Qty: 42 TABLET | Refills: 0 | Status: SHIPPED | OUTPATIENT
Start: 2024-08-06

## 2024-08-06 NOTE — PATIENT INSTRUCTIONS
Take 60 mg day 1 ( 6 tablets) and take 60mg day 2 (6 tablets)  Take 50 mg day 3 ( 5 tablets) and take 50mg day 4 (5 tablets)  Take 40 mg day 5 ( 4 tablets) and take 40 mg day 6 (4 tablets)  Take 30 mg day 7 ( 3 tablets) and take 30mg day 8 ( 3 tablets)  Take 20 mg day 9 ( 2 tablets) and take 20 mg day 10 (2 tablets)  Take 10mg day 11 ( 1 tablet) and take 10 mg day 12 ( 1 tablet)    Continue your cream

## 2024-08-06 NOTE — TELEPHONE ENCOUNTER
Future Appointments   Date Time Provider Department Center   8/6/2024 12:20 PM Devan Cabral MD [facility]

## 2024-08-06 NOTE — PROGRESS NOTES
Chief Complaint   Patient presents with    Rash     Rash has gotten worse from 5 days ago, has spread more on the leg; left ankle is swollen and is causing Pt concern.         HPI  Pt's rash is spreading and very itchy. She notices some ankle swelling in her left lower ankle that has resolved but she was worried and wanted to be seen.     ROS  As per HPI and all other systems reviewed and are negative      Past Medical History:    Anal stricture    Arthritis    Family history of colon cancer    Sister    GERD with esophagitis    Hiatal hernia    History of hemorrhoids    History of pneumonia    Hyperlipidemia    Hypothyroidism    Pruritus ani       Past Surgical History:   Procedure Laterality Date    Anal sphincterotomy  2002          x2    Colonoscopy  2002    Colonoscopy  2012    Mild anal stricture and hemorrhoids    Colonoscopy  2016    Normal colon    Egd  2016    Antral gastropathy with erosive gastritis, mild GERD with esophagitis, large hiatal hernia    Egd  2016    Complete healing of the antrum, large hiatal hernia    Foot surgery      Hysterectomy      Oophorectomy      Removal of ovary(s) Bilateral        Social History     Socioeconomic History    Marital status:    Tobacco Use    Smoking status: Never     Passive exposure: Never    Smokeless tobacco: Never   Vaping Use    Vaping status: Never Used   Substance and Sexual Activity    Alcohol use: No    Drug use: No       Family History   Problem Relation Age of Onset    Ovarian Cancer Mother 40        30s or 40s    Uterine Cancer Mother 94    Colon Cancer Sister 42    Cancer Sister 30        Liver    Diabetes Other         maternal uncle        Current Outpatient Medications on File Prior to Visit   Medication Sig Dispense Refill    triamcinolone 0.1 % External Cream Apply topically 3 (three) times daily. 60 g 3    LEVOTHYROXINE 50 MCG Oral Tab TAKE 1 TABLET(50 MCG) BY MOUTH DAILY 90 tablet 0     atorvastatin 10 MG Oral Tab Take 0.5 tablets (5 mg total) by mouth daily. 45 tablet 0    IRON OR       Probiotic Product (PROBIOTIC DAILY OR) Take by mouth.      Clobetasol Propionate 0.05 % External Cream Apply sparingly to affected area BID  PRN 45 g 1    omega-3 fatty acids 1000 MG Oral Cap       nystatin 571934 UNIT/GM External Ointment MARGARITO EXT AA BID  1    Cholecalciferol (VITAMIN D3 SUPER STRENGTH) 2000 units Oral Tab       methylPREDNISolone 4 MG Oral Tablet Therapy Pack Take 1 tablet (4 mg total) by mouth As Directed. FOLLOW DIRECTIONS ON PACKAGING (Patient not taking: Reported on 8/6/2024)      doxycycline 100 MG Oral Cap Take 1 capsule (100 mg total) by mouth 2 (two) times daily. (Patient not taking: Reported on 8/6/2024)      sulfamethoxazole-trimethoprim DS (BACTRIM DS) 800-160 MG Oral Tab per tablet Take 1 tablet by mouth 2 (two) times daily. (Patient not taking: Reported on 8/6/2024) 14 tablet 0     No current facility-administered medications on file prior to visit.         Objective  Vitals:    08/06/24 1202   BP: 112/78   Pulse: 70   Resp: 18   Temp: 97.9 °F (36.6 °C)   SpO2: 97%   Weight: 165 lb 6.4 oz (75 kg)     Physical Exam  Constitutional:       Appearance: Normal appearance.   HEENT:      Head: Normocephalic and atraumatic.      Eyes: PERRLA no notable nystagmus  Musculoskeletal:         General: Normal range of motion.      Cervical back: Normal range of motion.   Skin:     General: Skin is warm and dry. Large well demarcated area of erythema on the back of her left calf. Several patches on her lower anterior legs  Neurological:      General: No focal deficit present.      Mental Status: She is alert and oriented to person, place, and time.   Psychiatric:         Mood and Affect: Mood normal.         Thought Content: Thought content normal.       Assessment and Plan  Samantha was seen today for rash.    Diagnoses and all orders for this visit:    Irritant contact dermatitis due to plants,  except food  -     predniSONE 10 MG Oral Tab; Take 6 tablets on day 1 and day 2 and decrease by 1 tablet every 2 days           Follow up  No follow-ups on file.      Patient Instructions  Patient Instructions   Take 60 mg day 1 ( 6 tablets) and take 60mg day 2 (6 tablets)  Take 50 mg day 3 ( 5 tablets) and take 50mg day 4 (5 tablets)  Take 40 mg day 5 ( 4 tablets) and take 40 mg day 6 (4 tablets)  Take 30 mg day 7 ( 3 tablets) and take 30mg day 8 ( 3 tablets)  Take 20 mg day 9 ( 2 tablets) and take 20 mg day 10 (2 tablets)  Take 10mg day 11 ( 1 tablet) and take 10 mg day 12 ( 1 tablet)    Continue your cream         Devan Cabral MD

## 2024-08-06 NOTE — TELEPHONE ENCOUNTER
PATIENT IS CALLING THIS MORNING.  SHE SAYS SHE HAD A SICK VISIT WITH DR PAYNE RECENTLY FOR A RASH ON LLE.  DR PAYNE PRESCRIBED MEDICATION.  PATIENT SAYS THE RASH HAS GOTTEN WORSE.  NOW HER ANKLE IS SWOLLEN. ASKING IF DR PAYNE CAN SEE HER TODAY.

## 2024-09-09 RX ORDER — LEVOTHYROXINE SODIUM 50 UG/1
50 TABLET ORAL DAILY
Qty: 90 TABLET | Refills: 0 | Status: SHIPPED | OUTPATIENT
Start: 2024-09-09

## 2024-09-09 NOTE — TELEPHONE ENCOUNTER
equested Renewals       Name from pharmacy: LEVOTHYROXINE 0.05MG (50MCG) TAB         Will file in chart as: LEVOTHYROXINE 50 MCG Oral Tab    Sig: TAKE 1 TABLET(50 MCG) BY MOUTH DAILY    Disp: 90 tablet    Refills: 0 (Pharmacy requested: Not specified)    Start: 9/7/2024    Class: Normal    Non-formulary    Last ordered: 3 months ago (6/7/2024) by Devan Cabral MD    Last refill: 6/7/2024    Rx #: [id number]    Thyroid Medication Protocol [id number]/07/2024 12:58 PM   Protocol Details TSH in past 12 months    Last TSH value is normal    In person appointment or virtual visit in the past 12 mos or appointment in next 3 mos      To be filled at: [id number] - [place], [place] - [address], [phone], [phone]             Last refill 6/7/24    LOV 8/6/24    Future Appointments   Date Time Provider Department Center   10/4/2024 10:30 AM Quint[facility] Cabrales MD [id number]       Last lab 6/3/24    Dr. Villanueva please advise

## 2024-10-03 ENCOUNTER — MED REC SCAN ONLY (OUTPATIENT)
Dept: FAMILY MEDICINE CLINIC | Facility: CLINIC | Age: 79
End: 2024-10-03

## 2024-10-09 ENCOUNTER — OFFICE VISIT (OUTPATIENT)
Dept: FAMILY MEDICINE CLINIC | Facility: CLINIC | Age: 79
End: 2024-10-09
Payer: COMMERCIAL

## 2024-10-09 VITALS
RESPIRATION RATE: 18 BRPM | HEIGHT: 60 IN | TEMPERATURE: 97 F | HEART RATE: 67 BPM | SYSTOLIC BLOOD PRESSURE: 126 MMHG | DIASTOLIC BLOOD PRESSURE: 78 MMHG | BODY MASS INDEX: 32.67 KG/M2 | WEIGHT: 166.38 LBS | OXYGEN SATURATION: 99 %

## 2024-10-09 DIAGNOSIS — Z00.00 MEDICARE ANNUAL WELLNESS VISIT, SUBSEQUENT: Primary | ICD-10-CM

## 2024-10-09 DIAGNOSIS — M81.0 AGE-RELATED OSTEOPOROSIS WITHOUT CURRENT PATHOLOGICAL FRACTURE: ICD-10-CM

## 2024-10-09 DIAGNOSIS — Z23 FLU VACCINE NEED: ICD-10-CM

## 2024-10-09 PROCEDURE — 3074F SYST BP LT 130 MM HG: CPT | Performed by: FAMILY MEDICINE

## 2024-10-09 PROCEDURE — 3008F BODY MASS INDEX DOCD: CPT | Performed by: FAMILY MEDICINE

## 2024-10-09 PROCEDURE — 1125F AMNT PAIN NOTED PAIN PRSNT: CPT | Performed by: FAMILY MEDICINE

## 2024-10-09 PROCEDURE — 3078F DIAST BP <80 MM HG: CPT | Performed by: FAMILY MEDICINE

## 2024-10-09 PROCEDURE — 96160 PT-FOCUSED HLTH RISK ASSMT: CPT | Performed by: FAMILY MEDICINE

## 2024-10-09 PROCEDURE — 1159F MED LIST DOCD IN RCRD: CPT | Performed by: FAMILY MEDICINE

## 2024-10-09 PROCEDURE — 1170F FXNL STATUS ASSESSED: CPT | Performed by: FAMILY MEDICINE

## 2024-10-09 PROCEDURE — G0008 ADMIN INFLUENZA VIRUS VAC: HCPCS | Performed by: FAMILY MEDICINE

## 2024-10-09 PROCEDURE — G0439 PPPS, SUBSEQ VISIT: HCPCS | Performed by: FAMILY MEDICINE

## 2024-10-09 PROCEDURE — 90662 IIV NO PRSV INCREASED AG IM: CPT | Performed by: FAMILY MEDICINE

## 2024-10-09 PROCEDURE — 1160F RVW MEDS BY RX/DR IN RCRD: CPT | Performed by: FAMILY MEDICINE

## 2024-10-09 RX ORDER — IBANDRONATE SODIUM 150 MG/1
150 TABLET, FILM COATED ORAL
Qty: 1 TABLET | Refills: 2 | Status: SHIPPED | OUTPATIENT
Start: 2024-10-09

## 2024-10-09 NOTE — PROGRESS NOTES
Subjective:   Samantha Mary is a 79 year old female who presents for a MA AHA (Medicare Advantage Annual Health Assessment) and Medicare Subsequent Annual Wellness visit (Pt already had Initial Annual Wellness) and scheduled follow up of multiple significant but stable problems.   Osteoporosis not treated. Advised to try Boniva and if tolerates well will have pt take for one year and repeat scan in one year. Pt request flu shot and will be provided    History/Other:   Fall Risk Assessment:   She has been screened for Falls and is low risk.      Cognitive Assessment:   She had a completely normal cognitive assessment - see flowsheet entries     Functional Ability/Status:   Samantha Mary has a completely normal functional assessment. See flowsheet for details.      Depression Screening (PHQ):  PHQ-2 SCORE: 0  , done 10/9/2024   If you checked off any problems, how difficult have these problems made it for you to do your work, take care of things at home, or get along with other people?: Not difficult at all              Advanced Directives:   She does have a Living Will but we do NOT have it on file in Epic.    She does have a POA but we do NOT have it on file in [employer].    Patient has Advance Care Planning documents but we do not have a copy in EMR. Discussed Advanced Care Planning with patient and instructed patient to get our office a copy to be scanned into EMR.      Patient Active Problem List   Diagnosis    GERD with esophagitis    Hiatal hernia    Family history of colon cancer    History of hemorrhoids    Hypothyroidism    Hyperlipidemia    Arthritis    History of pneumonia    Pruritus ani    Anal stricture    Chronic superficial gastritis with bleeding    Other specified hypothyroidism    Murmur    Nontoxic uninodular goiter    Lichen sclerosus et atrophicus    Hypovitaminosis D    Atrophic vaginitis    Abnormal CBC    Second degree hemorrhoids    Other polyosteoarthritis    Menopause    Iron deficiency  anemia, unspecified    Hypothyroidism, unspecified    Family history of malignant neoplasm of digestive organs    Gastro-esophageal reflux disease with esophagitis    Hyperlipidemia, unspecified     Allergies:  She is allergic to penicillin g and penicillins.    Current Medications:  Outpatient Medications Marked as Taking for the 10/9/24 encounter (Office Visit) with Devan Cabral MD   Medication Sig    Ibandronate Sodium 150 MG Oral Tab Take 1 tablet (150 mg total) by mouth every 30 (thirty) days.    LEVOTHYROXINE 50 MCG Oral Tab TAKE 1 TABLET(50 MCG) BY MOUTH DAILY    atorvastatin 10 MG Oral Tab Take 0.5 tablets (5 mg total) by mouth daily.    IRON OR     Probiotic Product (PROBIOTIC DAILY OR) Take by mouth.    Clobetasol Propionate 0.05 % External Cream Apply sparingly to affected area BID  PRN    omega-3 fatty acids 1000 MG Oral Cap     Cholecalciferol (VITAMIN D3 SUPER STRENGTH) 2000 units Oral Tab        Medical History:  She  has a past medical history of Anal stricture, Arthritis, Family history of colon cancer, GERD with esophagitis, Hiatal hernia, History of hemorrhoids, History of pneumonia, Hyperlipidemia, Hypothyroidism, and Pruritus ani.  Surgical History:  She  has a past surgical history that includes hysterectomy; ; removal of ovary(s) (Bilateral); colonoscopy (2002); anal sphincterotomy (2002); colonoscopy (2012); foot surgery (); colonoscopy (2016); egd (2016); egd (2016); and oophorectomy.   Family History:  Her family history includes Cancer (age of onset: 30) in her sister; Colon Cancer (age of onset: 42) in her sister; Diabetes in an other family member; Ovarian Cancer (age of onset: 40) in her mother; Uterine Cancer (age of onset: 94) in her mother.  Social History:  She  reports that she has never smoked. She has never been exposed to tobacco smoke. She has never used smokeless tobacco. She reports that she does not drink alcohol  and does not use drugs.    Tobacco:  She has never smoked tobacco.    CAGE Alcohol Screen:   CAGE screening score of 0 on 10/9/2024, showing low risk of alcohol abuse.      Patient Care Team:  Devan Cabral MD as PCP - General (Family Medicine)    Review of Systems   As per HPI and all other systems reviewed and are negative      Objective:   Physical Exam       /78   Pulse 67   Temp 97 °F (36.1 °C) (Temporal)   Resp 18   Ht 5' (1.524 m)   Wt 166 lb 6.4 oz (75.5 kg)   SpO2 99%   BMI 32.50 kg/m²  Estimated body mass index is 32.5 kg/m² as calculated from the following:    Height as of this encounter: 5' (1.524 m).    Weight as of this encounter: 166 lb 6.4 oz (75.5 kg).  Physical Exam  Constitutional:       Appearance: Normal appearance.   HEENT:      Head: Normocephalic and atraumatic.      Eyes: PERRLA no notable nystagmus     Ears: normal on observation     Nose: Nose normal.      Mouth: Mucous membranes are moist.      Neck: no masses no bruit  Cardiovascular:      Rate and Rhythm: Normal rate and regular rhythm. Systolic murmur with prominent S2 echo shoed EF 55% with MILD AR and MR  Pulmonary:      Effort: Pulmonary effort is normal.      Breath sounds: Normal breath sounds.   Abdominal:      General: Bowel sounds are normal.      Palpations: Abdomen is soft. There is no mass.   Musculoskeletal:         General: Normal range of motion.      Cervical back: Normal range of motion.   Skin:     General: Skin is warm and dry.   Neurological:      General: No focal deficit present.      Mental Status: She is alert and oriented to person, place, and time.   Psychiatric:         Mood and Affect: Mood normal.         Thought Content: Thought content normal.     Medicare Hearing Assessment:   Hearing Screening    Time taken: 10/9/2024 10:45 AM  Screening Method: Finger Rub  Finger Rub Result: Pass         Visual Acuity:   Right Eye Visual Acuity: Corrected Right Eye Chart Acuity: 20/50   Left Eye  Visual Acuity: Corrected Left Eye Chart Acuity: 20/40   Both Eyes Visual Acuity: Corrected Both Eyes Chart Acuity: 20/25            Assessment & Plan:   Samantha Mary is a 79 year old female who presents for a Medicare Assessment.     1. Medicare annual wellness visit, subsequent (Primary)  -     CBC With Differential With Platelet; Future; Expected date: 10/10/2024  -     Comp Metabolic Panel (14); Future; Expected date: 10/10/2024  -     Lipid Panel; Future; Expected date: 10/10/2024  2. Age-related osteoporosis without current pathological fracture  -     Ibandronate Sodium; Take 1 tablet (150 mg total) by mouth every 30 (thirty) days.  Dispense: 1 tablet; Refill: 2  3. Flu vaccine need  -     High Dose Fluzone trivalent influenza, 65 yrs+ PFS [70779]    The patient indicates understanding of these issues and agrees to the plan.  Imaging studies ordered.  Lab work ordered.  Reinforced healthy diet, lifestyle, and exercise.      No follow-ups on file.     Devan Cabral MD, 10/9/2024     Supplementary Documentation:   General Health:  In the past six months, have you lost more than 10 pounds without trying?: 2 - No  Has your appetite been poor?: No  Type of Diet: Balanced  How does the patient maintain a good energy level?: Daily Walks  How would you describe your daily physical activity?: Moderate  How would you describe your current health state?: Good  How do you maintain positive mental well-being?: Social Interaction;Puzzles;Games;Visiting Family  On a scale of 0 to 10, with 0 being no pain and 10 being severe pain, what is your pain level?: 1 - (Mild)  In the past six months, have you experienced urine leakage?: 0-No  At any time do you feel concerned for the safety/well-being of yourself and/or your children, in your home or elsewhere?: No  Have you had any immunizations at another office such as Influenza, Hepatitis B, Tetanus, or Pneumococcal?: No    Health Maintenance   Topic Date Due    MA  Annual Health Assessment  01/01/2024    COVID-19 Vaccine (8 - 2023-24 season) 11/09/2024 (Originally 9/1/2024)    Colorectal Cancer Screening  06/21/2026    Influenza Vaccine  Completed    DEXA Scan  Completed    Annual Depression Screening  Completed    Fall Risk Screening (Annual)  Completed    Pneumococcal Vaccine: 65+ Years  Completed    Zoster Vaccines  Completed    Mammogram  Discontinued

## 2024-10-14 ENCOUNTER — OFFICE VISIT (OUTPATIENT)
Dept: ORTHOPEDICS CLINIC | Facility: CLINIC | Age: 79
End: 2024-10-14
Payer: COMMERCIAL

## 2024-10-14 ENCOUNTER — HOSPITAL ENCOUNTER (OUTPATIENT)
Dept: GENERAL RADIOLOGY | Age: 79
Discharge: HOME | End: 2024-10-14
Attending: ORTHOPAEDIC SURGERY
Payer: MEDICARE

## 2024-10-14 VITALS — BODY MASS INDEX: 32.59 KG/M2 | WEIGHT: 166 LBS | HEIGHT: 60 IN

## 2024-10-14 DIAGNOSIS — M79.645 FINGER PAIN, LEFT: Primary | ICD-10-CM

## 2024-10-14 DIAGNOSIS — M79.645 FINGER PAIN, LEFT: ICD-10-CM

## 2024-10-14 PROCEDURE — 3008F BODY MASS INDEX DOCD: CPT | Performed by: ORTHOPAEDIC SURGERY

## 2024-10-14 PROCEDURE — 99212 OFFICE O/P EST SF 10 MIN: CPT | Performed by: ORTHOPAEDIC SURGERY

## 2024-10-14 PROCEDURE — 1160F RVW MEDS BY RX/DR IN RCRD: CPT | Performed by: ORTHOPAEDIC SURGERY

## 2024-10-14 PROCEDURE — 1159F MED LIST DOCD IN RCRD: CPT | Performed by: ORTHOPAEDIC SURGERY

## 2024-10-14 PROCEDURE — 1126F AMNT PAIN NOTED NONE PRSNT: CPT | Performed by: ORTHOPAEDIC SURGERY

## 2024-10-14 PROCEDURE — 73140 X-RAY EXAM OF FINGER(S): CPT | Performed by: ORTHOPAEDIC SURGERY

## 2024-10-14 NOTE — PROGRESS NOTES
Clinic Note     Assessment/Plan:  79 year old with   Triggering of left ring finger status post 1 corticosteroid injection with resolution  Left middle finger finger DIP joint  OA -  minimal pain but does have some clicking which is likely secondary to the asymmetric wear of the joint.    Physical Exam:    Ht 5' (1.524 m)   Wt 166 lb (75.3 kg)   BMI 32.42 kg/m²     Constitutional: NAD. AOx3. Well-developed and Well-nourished.   Psychiatric: Normal mood/ affect/ behavior. Judgment and thought content normal.     Left upper Extremity:   Inspection: Skin Intact. No skin lesions. No gross deformity.  Hypertrophic middle finger DIP joint   Palpation:  (-) TTP over A1 pulley   Motion: Elbow: normal bilateral symmetric ext/flex  Wrist: normal bilateral symmetric ext/flex/sup/pro  Finger: full composite fist,    Special Tests: (-) Slight triggering. (-) PIPJ contracture. Normally sensate digit. Normally perfused digit.  Subtle instability of the DIP joint       CC: Triggering of left ring finger    HPI: 79 year old right-hand-dominant female presents with triggering of left ring finger. It started 1 month ago. It has failed to improve/resolve. Pain level is none. Pain is described as locking. Patient has tried nothing.     (+) pain at A1 Pulley  (+) active triggering    Interval Hx (10/14/2024): Patient reports some pain very mild in the middle finger DIP joint.  As well as some clicking.    Past Medical History:    Anal stricture    Arthritis    Family history of colon cancer    Sister    GERD with esophagitis    Hiatal hernia    History of hemorrhoids    History of pneumonia    Hyperlipidemia    Hypothyroidism    Pruritus ani     Past Surgical History:   Procedure Laterality Date    Anal sphincterotomy  2002          x2    Colonoscopy  2002    Colonoscopy  2012    Mild anal stricture and hemorrhoids    Colonoscopy  2016    Normal colon    Egd  2016    Antral gastropathy with  erosive gastritis, mild GERD with esophagitis, large hiatal hernia    Egd  05/11/2016    Complete healing of the antrum, large hiatal hernia    Foot surgery  2013    Hysterectomy      Oophorectomy      Removal of ovary(s) Bilateral      Current Outpatient Medications   Medication Sig Dispense Refill    Ibandronate Sodium 150 MG Oral Tab Take 1 tablet (150 mg total) by mouth every 30 (thirty) days. 1 tablet 2    LEVOTHYROXINE 50 MCG Oral Tab TAKE 1 TABLET(50 MCG) BY MOUTH DAILY 90 tablet 0    atorvastatin 10 MG Oral Tab Take 0.5 tablets (5 mg total) by mouth daily. 45 tablet 0    IRON OR       Probiotic Product (PROBIOTIC DAILY OR) Take by mouth.      Clobetasol Propionate 0.05 % External Cream Apply sparingly to affected area BID  PRN 45 g 1    omega-3 fatty acids 1000 MG Oral Cap       nystatin 387380 UNIT/GM External Ointment MARGARITO EXT AA BID (Patient not taking: Reported on 10/14/2024)  1    Cholecalciferol (VITAMIN D3 SUPER STRENGTH) 2000 units Oral Tab        Allergies   Allergen Reactions    Penicillin G HIVES    Penicillins HIVES     Family History   Problem Relation Age of Onset    Ovarian Cancer Mother 40        30s or 40s    Uterine Cancer Mother 94    Colon Cancer Sister 42    Cancer Sister 30        Liver    Diabetes Other         maternal uncle     Social History     Occupational History    Not on file   Tobacco Use    Smoking status: Never     Passive exposure: Never    Smokeless tobacco: Never   Vaping Use    Vaping status: Never Used   Substance and Sexual Activity    Alcohol use: No    Drug use: No    Sexual activity: Not on file        Review of Systems (negative unless bolded):  General: fevers, chills, fatigue  CV:  chest pain, palpitations, leg swelling  Msk: bodyaches, neck pain, neck stiffness  Skin: rashes, open wounds, nonhealing ulcers  Hem: bleeds easily, bruise easily, immunocompromised  Neuro: dizziness, light headedness, headaches  Psych: anxious, depressed, anger issues     Quinten Cabrales  MD   Hand, Wrist, & Elbow Surgery  [email or web address]  t: [phone]  f: [phone]

## 2024-10-17 ENCOUNTER — LAB ENCOUNTER (OUTPATIENT)
Dept: LAB | Age: 79
End: 2024-10-17
Attending: FAMILY MEDICINE
Payer: MEDICARE

## 2024-10-17 DIAGNOSIS — Z00.00 MEDICARE ANNUAL WELLNESS VISIT, SUBSEQUENT: ICD-10-CM

## 2024-10-17 LAB
ALBUMIN SERPL-MCNC: 4.5 G/DL (ref 3.2–4.8)
ALBUMIN/GLOB SERPL: 1.7 {RATIO} (ref 1–2)
ALP LIVER SERPL-CCNC: 107 U/L
ALT SERPL-CCNC: 14 U/L
ANION GAP SERPL CALC-SCNC: 6 MMOL/L (ref 0–18)
AST SERPL-CCNC: 19 U/L (ref ?–34)
BASOPHILS # BLD AUTO: 0.09 X10(3) UL (ref 0–0.2)
BASOPHILS NFR BLD AUTO: 1.7 %
BILIRUB SERPL-MCNC: 0.4 MG/DL (ref 0.2–1.1)
BUN BLD-MCNC: 10 MG/DL (ref 9–23)
CALCIUM BLD-MCNC: 10 MG/DL (ref 8.7–10.4)
CHLORIDE SERPL-SCNC: 107 MMOL/L (ref 98–112)
CHOLEST SERPL-MCNC: 158 MG/DL (ref ?–200)
CO2 SERPL-SCNC: 28 MMOL/L (ref 21–32)
CREAT BLD-MCNC: 0.89 MG/DL
EGFRCR SERPLBLD CKD-EPI 2021: 67 ML/MIN/1.73M2 (ref 60–?)
EOSINOPHIL # BLD AUTO: 0.19 X10(3) UL (ref 0–0.7)
EOSINOPHIL NFR BLD AUTO: 3.6 %
ERYTHROCYTE [DISTWIDTH] IN BLOOD BY AUTOMATED COUNT: 15.4 %
FASTING PATIENT LIPID ANSWER: YES
FASTING STATUS PATIENT QL REPORTED: YES
GLOBULIN PLAS-MCNC: 2.6 G/DL (ref 2–3.5)
GLUCOSE BLD-MCNC: 102 MG/DL (ref 70–99)
HCT VFR BLD AUTO: 34.2 %
HDLC SERPL-MCNC: 48 MG/DL (ref 40–59)
HGB BLD-MCNC: 10.3 G/DL
IMM GRANULOCYTES # BLD AUTO: 0.01 X10(3) UL (ref 0–1)
IMM GRANULOCYTES NFR BLD: 0.2 %
LDLC SERPL CALC-MCNC: 88 MG/DL (ref ?–100)
LYMPHOCYTES # BLD AUTO: 1.17 X10(3) UL (ref 1–4)
LYMPHOCYTES NFR BLD AUTO: 21.9 %
MCH RBC QN AUTO: 25.9 PG (ref 26–34)
MCHC RBC AUTO-ENTMCNC: 30.1 G/DL (ref 31–37)
MCV RBC AUTO: 86.1 FL
MONOCYTES # BLD AUTO: 0.48 X10(3) UL (ref 0.1–1)
MONOCYTES NFR BLD AUTO: 9 %
NEUTROPHILS # BLD AUTO: 3.4 X10 (3) UL (ref 1.5–7.7)
NEUTROPHILS # BLD AUTO: 3.4 X10(3) UL (ref 1.5–7.7)
NEUTROPHILS NFR BLD AUTO: 63.6 %
NONHDLC SERPL-MCNC: 110 MG/DL (ref ?–130)
OSMOLALITY SERPL CALC.SUM OF ELEC: 291 MOSM/KG (ref 275–295)
PLATELET # BLD AUTO: 257 10(3)UL (ref 150–450)
POTASSIUM SERPL-SCNC: 4.4 MMOL/L (ref 3.5–5.1)
PROT SERPL-MCNC: 7.1 G/DL (ref 5.7–8.2)
RBC # BLD AUTO: 3.97 X10(6)UL
SODIUM SERPL-SCNC: 141 MMOL/L (ref 136–145)
TRIGL SERPL-MCNC: 124 MG/DL (ref 30–149)
VLDLC SERPL CALC-MCNC: 20 MG/DL (ref 0–30)
WBC # BLD AUTO: 5.3 X10(3) UL (ref 4–11)

## 2024-10-17 PROCEDURE — 80061 LIPID PANEL: CPT

## 2024-10-17 PROCEDURE — 80053 COMPREHEN METABOLIC PANEL: CPT

## 2024-10-17 PROCEDURE — 85025 COMPLETE CBC W/AUTO DIFF WBC: CPT

## 2024-10-17 PROCEDURE — 36415 COLL VENOUS BLD VENIPUNCTURE: CPT

## 2024-12-05 NOTE — TELEPHONE ENCOUNTER
LEVOTHYROXINE 0.05MG (50MCG) TAB     Thyroid Medication Protocol [id number]/05/2024 04:12 PM   Protocol Details TSH in past 12 months    Last TSH value is normal    In person appointment or virtual visit in the past 12 mos or appointment in next 3 mos      LOV 10/9/2024  Last Px:10/9/2024  Last labs 6/3/2024, TSH, normal  Last refill on 9/9/2024, for #90, with 0 refills    No future appointments.

## 2024-12-06 RX ORDER — LEVOTHYROXINE SODIUM 50 UG/1
50 TABLET ORAL DAILY
Qty: 90 TABLET | Refills: 0 | Status: SHIPPED | OUTPATIENT
Start: 2024-12-06

## 2025-01-28 RX ORDER — ATORVASTATIN CALCIUM 10 MG/1
5 TABLET, FILM COATED ORAL DAILY
Qty: 45 TABLET | Refills: 0 | Status: SHIPPED | OUTPATIENT
Start: 2025-01-28

## 2025-01-28 RX ORDER — ATORVASTATIN CALCIUM 10 MG/1
5 TABLET, FILM COATED ORAL DAILY
Qty: 45 TABLET | Refills: 0 | Status: SHIPPED | OUTPATIENT
Start: 2025-01-28 | End: 2025-01-28

## 2025-01-28 NOTE — TELEPHONE ENCOUNTER
Cholesterol Medication Protocol [id number]/28/2025 08:18 AM   Protocol Details ALT < 80    ALT resulted within past year    Lipid panel within past 12 months    In person appointment or virtual visit in the past 12 mos or appointment in next 3 mos    Medication is active on med list

## 2025-01-30 RX ORDER — ATORVASTATIN CALCIUM 10 MG/1
5 TABLET, FILM COATED ORAL DAILY
Qty: 45 TABLET | Refills: 0 | OUTPATIENT
Start: 2025-01-30

## 2025-01-30 NOTE — TELEPHONE ENCOUNTER
Disp Refills Start End    atorvastatin 10 MG Oral Tab 45 tablet 0 1/28/2025 --    Sig - Route: Take 0.5 tablets (5 mg total) by mouth daily. - Oral    Sent to pharmacy as: Atorvastatin Calcium 10 MG Oral Tablet (Lipitor)    E-Prescribing Status: Receipt confirmed by pharmacy (1/28/2025  1:13 PM CST)    Duplicate rx refill: request denied.

## 2025-03-03 RX ORDER — LEVOTHYROXINE SODIUM 50 UG/1
50 TABLET ORAL DAILY
Qty: 90 TABLET | Refills: 0 | Status: SHIPPED | OUTPATIENT
Start: 2025-03-03

## 2025-03-03 NOTE — TELEPHONE ENCOUNTER
Thyroid Medication Protocol Passed03/03/2025 11:47 AM   Protocol Details TSH in past 12 months    Last TSH value is normal    In person appointment or virtual visit in the past 12 mos or appointment in next 3 mos    Medication is active on med list

## 2025-03-04 ENCOUNTER — TELEPHONE (OUTPATIENT)
Dept: FAMILY MEDICINE CLINIC | Facility: CLINIC | Age: 80
End: 2025-03-04

## 2025-03-04 DIAGNOSIS — L03.032 PARONYCHIA OF GREAT TOE OF LEFT FOOT: Primary | ICD-10-CM

## 2025-03-04 RX ORDER — SULFAMETHOXAZOLE AND TRIMETHOPRIM 800; 160 MG/1; MG/1
1 TABLET ORAL 2 TIMES DAILY
Qty: 14 TABLET | Refills: 0 | Status: SHIPPED | OUTPATIENT
Start: 2025-03-04

## 2025-03-04 NOTE — TELEPHONE ENCOUNTER
Patient here with  and has a painful left great toe and sates she was walking with ill fitting shoe and it rubbed and now the base of nail is swollen and boggy and tender to touch. Issue ongoing 4-5 days    Rx sent for bactrim

## 2025-04-23 ENCOUNTER — MED REC SCAN ONLY (OUTPATIENT)
Dept: FAMILY MEDICINE CLINIC | Facility: CLINIC | Age: 80
End: 2025-04-23

## 2025-06-02 RX ORDER — LEVOTHYROXINE SODIUM 50 UG/1
50 TABLET ORAL DAILY
Qty: 90 TABLET | Refills: 0 | Status: SHIPPED | OUTPATIENT
Start: 2025-06-02

## 2025-06-02 NOTE — TELEPHONE ENCOUNTER
Thyroid Medication Protocol Passed06/01/2025 08:12 AM   Protocol Details TSH in past 12 months    Last TSH value is normal    In person appointment or virtual visit in the past 12 mos or appointment in next 3 mos    Medication is active on med list

## 2025-06-27 ENCOUNTER — TELEPHONE (OUTPATIENT)
Dept: FAMILY MEDICINE CLINIC | Facility: CLINIC | Age: 80
End: 2025-06-27

## 2025-06-27 DIAGNOSIS — R91.1 PULMONARY NODULE: Primary | ICD-10-CM

## 2025-06-27 DIAGNOSIS — K44.9 LARGE HIATAL HERNIA: ICD-10-CM

## 2025-06-27 NOTE — TELEPHONE ENCOUNTER
Let patient know that I am ordering repeat CT chest for her to follow-up on her pulmonary nodule. Thank you

## 2025-06-27 NOTE — TELEPHONE ENCOUNTER
Called pt and advised her of below.  She was in a store and was going to call back if any questions.  Otherwise, pt has an appt here on 6/30---can discuss further at that time

## 2025-06-30 ENCOUNTER — OFFICE VISIT (OUTPATIENT)
Dept: FAMILY MEDICINE CLINIC | Facility: CLINIC | Age: 80
End: 2025-06-30
Payer: COMMERCIAL

## 2025-06-30 VITALS
WEIGHT: 165.81 LBS | OXYGEN SATURATION: 97 % | SYSTOLIC BLOOD PRESSURE: 122 MMHG | HEIGHT: 59 IN | HEART RATE: 64 BPM | TEMPERATURE: 97 F | DIASTOLIC BLOOD PRESSURE: 80 MMHG | BODY MASS INDEX: 33.43 KG/M2 | RESPIRATION RATE: 18 BRPM

## 2025-06-30 DIAGNOSIS — E78.2 MIXED HYPERLIPIDEMIA: ICD-10-CM

## 2025-06-30 DIAGNOSIS — Z00.00 MEDICARE ANNUAL WELLNESS VISIT, SUBSEQUENT: Primary | ICD-10-CM

## 2025-06-30 DIAGNOSIS — E07.9 THYROID DISEASE: ICD-10-CM

## 2025-06-30 DIAGNOSIS — R73.9 ELEVATED BLOOD SUGAR: ICD-10-CM

## 2025-06-30 DIAGNOSIS — M81.0 AGE-RELATED OSTEOPOROSIS WITHOUT CURRENT PATHOLOGICAL FRACTURE: ICD-10-CM

## 2025-06-30 LAB
ALBUMIN SERPL-MCNC: 4.3 G/DL (ref 3.2–4.8)
ALBUMIN/GLOB SERPL: 1.7 {RATIO} (ref 1–2)
ALP LIVER SERPL-CCNC: 89 U/L (ref 55–142)
ALT SERPL-CCNC: 12 U/L (ref 10–49)
ANION GAP SERPL CALC-SCNC: 8 MMOL/L (ref 0–18)
AST SERPL-CCNC: 18 U/L (ref ?–34)
BASOPHILS # BLD AUTO: 0.07 X10(3) UL (ref 0–0.2)
BASOPHILS NFR BLD AUTO: 1.3 %
BILIRUB SERPL-MCNC: 0.8 MG/DL (ref 0.2–1.1)
BUN BLD-MCNC: 14 MG/DL (ref 9–23)
CALCIUM BLD-MCNC: 9.5 MG/DL (ref 8.7–10.6)
CHLORIDE SERPL-SCNC: 105 MMOL/L (ref 98–112)
CHOLEST SERPL-MCNC: 166 MG/DL (ref ?–200)
CO2 SERPL-SCNC: 28 MMOL/L (ref 21–32)
CREAT BLD-MCNC: 0.88 MG/DL (ref 0.55–1.02)
EGFRCR SERPLBLD CKD-EPI 2021: 68 ML/MIN/1.73M2 (ref 60–?)
EOSINOPHIL # BLD AUTO: 0.12 X10(3) UL (ref 0–0.7)
EOSINOPHIL NFR BLD AUTO: 2.3 %
ERYTHROCYTE [DISTWIDTH] IN BLOOD BY AUTOMATED COUNT: 13.7 %
EST. AVERAGE GLUCOSE BLD GHB EST-MCNC: 114 MG/DL (ref 68–126)
FASTING PATIENT LIPID ANSWER: YES
FASTING STATUS PATIENT QL REPORTED: YES
GLOBULIN PLAS-MCNC: 2.5 G/DL (ref 2–3.5)
GLUCOSE BLD-MCNC: 92 MG/DL (ref 70–99)
HBA1C MFR BLD: 5.6 % (ref ?–5.7)
HCT VFR BLD AUTO: 42.7 % (ref 35–48)
HDLC SERPL-MCNC: 51 MG/DL (ref 40–59)
HGB BLD-MCNC: 14.2 G/DL (ref 12–16)
IMM GRANULOCYTES # BLD AUTO: 0.02 X10(3) UL (ref 0–1)
IMM GRANULOCYTES NFR BLD: 0.4 %
LDLC SERPL CALC-MCNC: 90 MG/DL (ref ?–100)
LYMPHOCYTES # BLD AUTO: 1.16 X10(3) UL (ref 1–4)
LYMPHOCYTES NFR BLD AUTO: 21.8 %
MCH RBC QN AUTO: 31.3 PG (ref 26–34)
MCHC RBC AUTO-ENTMCNC: 33.3 G/DL (ref 31–37)
MCV RBC AUTO: 94.1 FL (ref 80–100)
MONOCYTES # BLD AUTO: 0.39 X10(3) UL (ref 0.1–1)
MONOCYTES NFR BLD AUTO: 7.3 %
NEUTROPHILS # BLD AUTO: 3.57 X10 (3) UL (ref 1.5–7.7)
NEUTROPHILS # BLD AUTO: 3.57 X10(3) UL (ref 1.5–7.7)
NEUTROPHILS NFR BLD AUTO: 66.9 %
NONHDLC SERPL-MCNC: 115 MG/DL (ref ?–130)
OSMOLALITY SERPL CALC.SUM OF ELEC: 292 MOSM/KG (ref 275–295)
PLATELET # BLD AUTO: 176 10(3)UL (ref 150–450)
POTASSIUM SERPL-SCNC: 4.1 MMOL/L (ref 3.5–5.1)
PROT SERPL-MCNC: 6.8 G/DL (ref 5.7–8.2)
RBC # BLD AUTO: 4.54 X10(6)UL (ref 3.8–5.3)
SODIUM SERPL-SCNC: 141 MMOL/L (ref 136–145)
TRIGL SERPL-MCNC: 144 MG/DL (ref 30–149)
TSI SER-ACNC: 1.13 UIU/ML (ref 0.55–4.78)
VLDLC SERPL CALC-MCNC: 23 MG/DL (ref 0–30)
WBC # BLD AUTO: 5.3 X10(3) UL (ref 4–11)

## 2025-06-30 PROCEDURE — 85025 COMPLETE CBC W/AUTO DIFF WBC: CPT | Performed by: FAMILY MEDICINE

## 2025-06-30 PROCEDURE — 84443 ASSAY THYROID STIM HORMONE: CPT | Performed by: FAMILY MEDICINE

## 2025-06-30 PROCEDURE — 83036 HEMOGLOBIN GLYCOSYLATED A1C: CPT | Performed by: FAMILY MEDICINE

## 2025-06-30 PROCEDURE — 80061 LIPID PANEL: CPT | Performed by: FAMILY MEDICINE

## 2025-06-30 PROCEDURE — 80053 COMPREHEN METABOLIC PANEL: CPT | Performed by: FAMILY MEDICINE

## 2025-06-30 NOTE — PROGRESS NOTES
Subjective:   Samantha Mary is a 79 year old female who presents for a MA AHA (Medicare Advantage Annual Health Assessment) and Subsequent Annual Wellness visit (Pt already had Initial Annual Wellness) and scheduled follow up of multiple significant but stable problems.   History of Present Illness  Samantha Mary is a 79 year old female who presents with stiffness and drainage in her throat.    She experiences stiffness and incorporates walking in the morning as part of her routine. She takes a multivitamin formulated for individuals over fifty but has not been consistent with it, having missed it for about three days.    She is on omeprazole for her hiatal hernia. Her diet consists mainly of 'junk food', and she acknowledges a previous instance of elevated blood sugar. She feels her body is getting heavier, although she has not gained weight.    She reports drainage down her throat and had green mucus the previous day. She experienced a slight headache recently but denies any fever. She recently had her ears cleaned due to blockage, which affected her hearing, noting that her right ear was completely blocked.    She has a history of a slight heart murmur and is awaiting a follow-up with a cardiologist.    History/Other:   Fall Risk Assessment:   She has been screened for Falls and is low risk.      Cognitive Assessment:   She had a completely normal cognitive assessment - see flowsheet entries     Functional Ability/Status:   Samantha Mary has a completely normal functional assessment. See flowsheet for details.      Depression Screening (PHQ):  PHQ-2 SCORE: 0  , done 6/30/2025          Advanced Directives:   She does have a Living Will but we do NOT have it on file in Epic.    She does have a POA but we do NOT have it on file in [employer].    Patient has Advance Care Planning documents but we do not have a copy in EMR. Discussed Advanced Care Planning with patient and instructed patient to get our office a copy to  be scanned into EMR.      Problem List[1]  Allergies:  She is allergic to penicillin g and penicillins.    Current Medications:  Active Meds, Sig Only[2]    Medical History:  She  has a past medical history of Anal stricture, Arthritis, Family history of colon cancer, GERD with esophagitis, Hiatal hernia, History of hemorrhoids, History of pneumonia, Hyperlipidemia, Hypothyroidism, and Pruritus ani.  Surgical History:  She  has a past surgical history that includes hysterectomy; ; removal of ovary(s) (Bilateral); colonoscopy (2002); anal sphincterotomy (2002); colonoscopy (2012); foot surgery (); colonoscopy (2016); egd (2016); egd (2016); and oophorectomy.   Family History:  Her family history includes Cancer (age of onset: 30) in her sister; Colon Cancer (age of onset: 42) in her sister; Diabetes in an other family member; Ovarian Cancer (age of onset: 40) in her mother; Uterine Cancer (age of onset: 94) in her mother.  Social History:  She  reports that she has never smoked. She has never been exposed to tobacco smoke. She has never used smokeless tobacco. She reports that she does not drink alcohol and does not use drugs.    Tobacco:  She has never smoked tobacco.    CAGE Alcohol Screen:   CAGE screening score of 0 on 2025, showing low risk of alcohol abuse.      Patient Care Team:  Devan Cabral MD as PCP - General (Family Medicine)    Review of Systems  As per HPI and all other systems reviewed and are negative      Objective:   Physical Exam      /80   Pulse 64   Temp 97.4 °F (36.3 °C) (Temporal)   Resp 18   Ht 4' 11\" (1.499 m)   Wt 165 lb 12.8 oz (75.2 kg)   SpO2 97%   BMI 33.49 kg/m²  Estimated body mass index is 33.49 kg/m² as calculated from the following:    Height as of this encounter: 4' 11\" (1.499 m).    Weight as of this encounter: 165 lb 12.8 oz (75.2 kg).  Physical Exam  Constitutional:       Appearance: Normal  appearance.   HEENT:      Head: Normocephalic and atraumatic.      Eyes: PERRLA no notable nystagmus     Ears: normal on observation     Nose: Nose normal.      Mouth: Mucous membranes are moist.      Neck: no masses no bruit  Cardiovascular:      Rate and Rhythm: Normal rate and regular rhythm.   Pulmonary:      Effort: Pulmonary effort is normal.      Breath sounds: Normal breath sounds.   Abdominal:      General: Bowel sounds are normal.      Palpations: Abdomen is soft. There is no mass.   Musculoskeletal:         General: Normal range of motion.      Cervical back: Normal range of motion.   Skin:     General: Skin is warm and dry.   Neurological:      General: No focal deficit present.      Mental Status: She is alert and oriented to person, place, and time.   Psychiatric:         Mood and Affect: Mood normal.         Thought Content: Thought content normal.     Medicare Hearing Assessment:   Hearing Screening    Time taken: 6/30/2025  9:53 AM  Screening Method: Finger Rub  Finger Rub Result: Pass         Visual Acuity:   Right Eye Visual Acuity: Corrected Right Eye Chart Acuity: 20/40   Left Eye Visual Acuity: Corrected Left Eye Chart Acuity: 20/25   Both Eyes Visual Acuity: Corrected Both Eyes Chart Acuity: 20/25   Able To Tolerate Visual Acuity: Yes        Assessment & Plan:   Samantha Mary is a 79 year old female who presents for a Medicare Assessment.     1. Medicare annual wellness visit, subsequent (Primary)  -     CBC With Differential With Platelet; Future; Expected date: 06/30/2025  -     Comp Metabolic Panel (14); Future; Expected date: 06/30/2025  -     Lipid Panel; Future; Expected date: 06/30/2025  -     Assay, Thyroid Stim Hormone; Future; Expected date: 06/30/2025  -     CBC With Differential With Platelet  -     Comp Metabolic Panel (14)  -     Lipid Panel  -     Assay, Thyroid Stim Hormone  2. Age-related osteoporosis without current pathological fracture  -     XR DEXA BONE DENSITOMETRY  (CPT=77080); Future; Expected date: 06/30/2025  3. Thyroid disease  -     Assay, Thyroid Stim Hormone; Future; Expected date: 06/30/2025  -     Assay, Thyroid Stim Hormone  4. Mixed hyperlipidemia  -     Lipid Panel; Future; Expected date: 06/30/2025  -     Lipid Panel  5. Elevated blood sugar  -     Hemoglobin A1C; Future; Expected date: 06/30/2025  -     Hemoglobin A1C  Assessment & Plan  Joint stiffness  Joint stiffness possibly due to arthritic changes or thyroid dysfunction. Emphasized mobility as key management strategy.  - Encourage mobility and walking, aiming for about a mile daily.  - Check thyroid function.  - Consider repeating thyroid test if biotin interference is suspected.    Elevated blood sugar  Elevated blood sugar with weight gain, possibly due to increased fat content as muscle converts to fat with age. Emphasized diet and exercise for weight and blood sugar management.  - Check A1c levels.  - Encourage weight lifting and focus on protein and vegetables in diet.  - Use healthy fats like olive oil and avocado oil.    Hiatal hernia  Chronic condition managed with omeprazole. Advised to use Pepcid or Tums as needed, especially with spicy foods or alcohol.  - Continue omeprazole.  - Keep Pepcid or Tums on hand for symptomatic relief.    Sinus congestion  Drainage down throat and green mucus, likely due to allergies. No fever or significant symptoms to suggest infection. Advised to monitor for worsening symptoms or increased facial pain.  - Use sinus rinse and Flonase.  - Use lemon juice and honey for drainage.    Osteoporosis  Weight lifting recommended to improve posture and manage osteoporosis. Emphasized benefits of weight lifting for bone health.  - Encourage weight lifting exercises.    Heart murmur  Slight heart murmur with pending cardiologist follow-up.  - Order echocardiogram if cardiologist appointment is not scheduled.    General Health Maintenance  Eye and dental exams are up to date.  Discussed importance of regular exercise for overall health maintenance.  - Encourage joining a gym for weight lifting and treadmill use.  The patient indicates understanding of these issues and agrees to the plan.  Imaging studies ordered.  Lab work ordered.  Reinforced healthy diet, lifestyle, and exercise.  No follow-ups on file.      No follow-ups on file.     Devan Cabral MD, 6/30/2025     Supplementary Documentation:   General Health:  In the past six months, have you lost more than 10 pounds without trying?: 2 - No  Has your appetite been poor?: No  Type of Diet: Other  How does the patient maintain a good energy level?: Daily Walks  How would you describe your daily physical activity?: Moderate  How would you describe your current health state?: Good  How do you maintain positive mental well-being?: Puzzles, Games, Visiting Friends, Visiting Family  On a scale of 0 to 10, with 0 being no pain and 10 being severe pain, what is your pain level?: 2 - (Mild)  In the past six months, have you experienced urine leakage?: 0-No  At any time do you feel concerned for the safety/well-being of yourself and/or your children, in your home or elsewhere?: No  Have you had any immunizations at another office such as Influenza, Hepatitis B, Tetanus, or Pneumococcal?: No    Health Maintenance   Topic Date Due    Annual Well Visit  01/01/2025    COVID-19 Vaccine (8 - 2024-25 season) 07/30/2025 (Originally 9/1/2024)    Colorectal Cancer Screening  06/21/2026    Influenza Vaccine  Completed    DEXA Scan  Completed    Annual Depression Screening  Completed    Fall Risk Screening (Annual)  Completed    Pneumococcal Vaccine: 50+ Years  Completed    Zoster Vaccines  Completed    Meningococcal B Vaccine  Aged Out    Mammogram  Discontinued            [1]   Patient Active Problem List  Diagnosis    GERD with esophagitis    Hiatal hernia    Family history of colon cancer    History of hemorrhoids    Hypothyroidism     Hyperlipidemia    Arthritis    History of pneumonia    Pruritus ani    Anal stricture    Chronic superficial gastritis with bleeding    Other specified hypothyroidism    Murmur    Nontoxic uninodular goiter    Lichen sclerosus et atrophicus    Hypovitaminosis D    Atrophic vaginitis    Abnormal CBC    Second degree hemorrhoids    Other polyosteoarthritis    Menopause    Iron deficiency anemia, unspecified    Hypothyroidism, unspecified    Family history of malignant neoplasm of digestive organs    Gastro-esophageal reflux disease with esophagitis    Hyperlipidemia, unspecified   [2]   Outpatient Medications Marked as Taking for the 6/30/25 encounter (Office Visit) with Devan Cabral MD   Medication Sig    LEVOTHYROXINE 50 MCG Oral Tab TAKE 1 TABLET(50 MCG) BY MOUTH DAILY    atorvastatin 10 MG Oral Tab Take 0.5 tablets (5 mg total) by mouth daily.    IRON OR     Probiotic Product (PROBIOTIC DAILY OR) Take by mouth.    Clobetasol Propionate 0.05 % External Cream Apply sparingly to affected area BID  PRN    omega-3 fatty acids 1000 MG Oral Cap     Cholecalciferol (VITAMIN D3 SUPER STRENGTH) 2000 units Oral Tab

## 2025-07-06 ENCOUNTER — HOSPITAL ENCOUNTER (OUTPATIENT)
Dept: CT IMAGING | Age: 80
Discharge: HOME | End: 2025-07-06
Attending: FAMILY MEDICINE
Payer: MEDICARE

## 2025-07-06 DIAGNOSIS — K44.9 LARGE HIATAL HERNIA: ICD-10-CM

## 2025-07-06 DIAGNOSIS — R91.1 PULMONARY NODULE: ICD-10-CM

## 2025-07-06 PROCEDURE — 71250 CT THORAX DX C-: CPT | Performed by: FAMILY MEDICINE

## 2025-07-07 ENCOUNTER — OFFICE VISIT (OUTPATIENT)
Dept: FAMILY MEDICINE CLINIC | Facility: CLINIC | Age: 80
End: 2025-07-07
Payer: COMMERCIAL

## 2025-07-07 VITALS
BODY MASS INDEX: 33 KG/M2 | TEMPERATURE: 98 F | RESPIRATION RATE: 18 BRPM | HEART RATE: 70 BPM | OXYGEN SATURATION: 98 % | WEIGHT: 165.81 LBS | DIASTOLIC BLOOD PRESSURE: 82 MMHG | SYSTOLIC BLOOD PRESSURE: 120 MMHG

## 2025-07-07 DIAGNOSIS — I27.20 PULMONARY HTN (HCC): Primary | ICD-10-CM

## 2025-07-07 PROCEDURE — 1160F RVW MEDS BY RX/DR IN RCRD: CPT | Performed by: FAMILY MEDICINE

## 2025-07-07 PROCEDURE — 3079F DIAST BP 80-89 MM HG: CPT | Performed by: FAMILY MEDICINE

## 2025-07-07 PROCEDURE — 1159F MED LIST DOCD IN RCRD: CPT | Performed by: FAMILY MEDICINE

## 2025-07-07 PROCEDURE — 3074F SYST BP LT 130 MM HG: CPT | Performed by: FAMILY MEDICINE

## 2025-07-07 PROCEDURE — 99214 OFFICE O/P EST MOD 30 MIN: CPT | Performed by: FAMILY MEDICINE

## 2025-07-07 NOTE — PROGRESS NOTES
The following individual(s) verbally consented to be recorded using ambient AI listening technology and understand that they can each withdraw their consent to this listening technology at any point by asking the clinician to turn off or pause the recording:    Patient name: Samantha Mary  Additional names:  none        Chief Complaint   Patient presents with    Follow - Up       History of Present Illness  Samantha Mary is a 79 year old female with pulmonary hypertension who presents for evaluation of her recent heart scan findings.    She underwent a heart scan revealing an enlarged pulmonary artery trunk measuring 3.2 cm, which exceeds the normal range of 1.5 to 2 cm. The scan also noted a tortuous aorta and an unusual subclavian artery. There is no significant coronary artery disease, only mild calcifications.    A CT scan of the chest showed a stable lung nodule with no changes, alleviating concerns about lung cancer. She also has a large hiatal hernia, which is associated with symptoms of acid reflux, including frequent 'gurgling' sounds but no regurgitation.    She has gallstones, which could potentially cause abdominal pain, especially after consuming fatty meals.    She experiences a sensation of congestion with greenish discharge, which has not worsened over the past week. She has not yet used Flonase or antihistamines for these symptoms.      Past Medical History[1]    Past Surgical History[2]    Social Hx on file[3]    Family History[4]     Medications Ordered Prior to Encounter[5]      Objective  Vitals:    07/07/25 1435   BP: 120/82   Pulse: 70   Resp: 18   Temp: 97.7 °F (36.5 °C)   TempSrc: Temporal   SpO2: 98%   Weight: 165 lb 12.8 oz (75.2 kg)         Body mass index is 33.49 kg/m².    Physical Exam  Constitutional:       Appearance: Normal appearance.   HEENT:      Head: Normocephalic and atraumatic.      Eyes: PERRLA no notable nystagmus     Ears: normal on observation     Nose: Nose  normal.      Mouth: Mucous membranes are moist.      Neck: no masses no bruit  Cardiovascular:      Rate and Rhythm: Normal rate and regular rhythm.   Pulmonary:      Effort: Pulmonary effort is normal.      Breath sounds: Normal breath sounds.   Abdominal:      General: Bowel sounds are normal.      Palpations: Abdomen is soft. There is no mass.   Musculoskeletal:         General: Normal range of motion.      Cervical back: Normal range of motion.   Skin:     General: Skin is warm and dry.   Neurological:      General: No focal deficit present.      Mental Status: She is alert and oriented to person, place, and time.   Psychiatric:         Mood and Affect: Mood normal.         Thought Content: Thought content normal.       Assessment and Plan  Assessment & Plan  Pulmonary Arterial Hypertension  CT scan shows pulmonary artery trunk enlargement at 3.2 cm, indicating pulmonary arterial hypertension. Asymptomatic with no significant coronary artery disease.  - Refer to pulmonologist Dr. Londono for further evaluation.    Hiatal Hernia with GERD  Large hiatal hernia contributing to GERD symptoms. She avoids spicy foods but needs caution with acidic foods.  - Advise avoiding spicy and acidic foods, including tomato-based products and citric acid.    Gallstones  Gallstones present. Monitor for abdominal pain after fatty meals.  - Instruct to report any abdominal pain, particularly after eating fatty meals.    Sinus Congestion  Persistent sinus congestion with greenish discharge, likely allergy-related.  - Recommend using Flonase and an antihistamine.    General Health Maintenance  Bone density scan due but delayed until after January 2026 due to insurance restrictions.      ICD-10-CM    1. Pulmonary HTN (HCC)  I27.20 Pulmonary Referral - In Network              Follow up  No follow-ups on file.      Patient Instructions  There are no Patient Instructions on file for this visit.       Devan Cabral MD       This  note was created by Dragon voice recognition and or [employer] transcription service. Errors in content may be related to improper recognition by the system; efforts to review and correct have been done but errors may still exist. Please be advised the primary purpose of this note is for me to communicate medical care. Standard sentence structure is not always used. Medical terminology and medical abbreviations may be used. There may be grammatical, typographical, and automated fill ins that may have errors missed in proofreading.        [1]   Past Medical History:   Anal stricture    Arthritis    Family history of colon cancer    Sister    GERD with esophagitis    Hiatal hernia    History of hemorrhoids    History of pneumonia    Hyperlipidemia    Hypothyroidism    Pruritus ani   [2]   Past Surgical History:  Procedure Laterality Date    Anal sphincterotomy  2002          x2    Colonoscopy  2002    Colonoscopy  2012    Mild anal stricture and hemorrhoids    Colonoscopy  2016    Normal colon    Egd  2016    Antral gastropathy with erosive gastritis, mild GERD with esophagitis, large hiatal hernia    Egd  2016    Complete healing of the antrum, large hiatal hernia    Foot surgery  2013    Hysterectomy      Oophorectomy      Removal of ovary(s) Bilateral    [3]   Social History  Socioeconomic History    Marital status:    Tobacco Use    Smoking status: Never     Passive exposure: Never    Smokeless tobacco: Never   Vaping Use    Vaping status: Never Used   Substance and Sexual Activity    Alcohol use: No    Drug use: No   [4]   Family History  Problem Relation Age of Onset    Ovarian Cancer Mother 40        30s or 40s    Uterine Cancer Mother 94    Colon Cancer Sister 42    Cancer Sister 30        Liver    Diabetes Other         maternal uncle   [5]   Current Outpatient Medications on File Prior to Visit   Medication Sig Dispense Refill    LEVOTHYROXINE 50 MCG Oral  Tab TAKE 1 TABLET(50 MCG) BY MOUTH DAILY 90 tablet 0    atorvastatin 10 MG Oral Tab Take 0.5 tablets (5 mg total) by mouth daily. 45 tablet 0    IRON OR       Probiotic Product (PROBIOTIC DAILY OR) Take by mouth.      Clobetasol Propionate 0.05 % External Cream Apply sparingly to affected area BID  PRN 45 g 1    omega-3 fatty acids 1000 MG Oral Cap       Cholecalciferol (VITAMIN D3 SUPER STRENGTH) 2000 units Oral Tab       nystatin [id number] UNIT/GM External Ointment MARGARITO EXT AA BID (Patient not taking: Reported on 7/7/2025)  1     No current facility-administered medications on file prior to visit.

## 2025-07-16 ENCOUNTER — OFFICE VISIT (OUTPATIENT)
Facility: CLINIC | Age: 80
End: 2025-07-16
Payer: COMMERCIAL

## 2025-07-16 VITALS
DIASTOLIC BLOOD PRESSURE: 80 MMHG | RESPIRATION RATE: 16 BRPM | HEIGHT: 59 IN | SYSTOLIC BLOOD PRESSURE: 120 MMHG | BODY MASS INDEX: 33.67 KG/M2 | WEIGHT: 167 LBS

## 2025-07-16 DIAGNOSIS — I28.8 ENLARGED PULMONARY ARTERY (HCC): ICD-10-CM

## 2025-07-16 DIAGNOSIS — R91.1 PULMONARY NODULE: Primary | ICD-10-CM

## 2025-07-16 PROCEDURE — 99204 OFFICE O/P NEW MOD 45 MIN: CPT | Performed by: INTERNAL MEDICINE

## 2025-07-16 PROCEDURE — 3008F BODY MASS INDEX DOCD: CPT | Performed by: INTERNAL MEDICINE

## 2025-07-16 PROCEDURE — 3079F DIAST BP 80-89 MM HG: CPT | Performed by: INTERNAL MEDICINE

## 2025-07-16 PROCEDURE — 1159F MED LIST DOCD IN RCRD: CPT | Performed by: INTERNAL MEDICINE

## 2025-07-16 PROCEDURE — 3074F SYST BP LT 130 MM HG: CPT | Performed by: INTERNAL MEDICINE

## 2025-07-16 PROCEDURE — 1160F RVW MEDS BY RX/DR IN RCRD: CPT | Performed by: INTERNAL MEDICINE

## 2025-07-16 NOTE — H&P
[facility] General Pulmonary Consult Note    History of Present Illness:  Samantha Mary is a 79 year old female never smoker with significant PMH of GERD, hiatal hernia, valvular dysfunction who presents today for evaluation of enlarged PA on chest imaging. She report having had a follow up Ct chest scan for a lung nodule seen last year on cardiac scan. The recent scan raised concern for pulm HTN leading to her evaluation here. She denies acute concerns. No cough, dyspnea, wheeze, pain. No hx of lung disease, asthma, bronchitis  Denies known exposures.  Denies hx of sleep apnea, no witnessed apnea, no snoring or hypersomnolence    Past Medical History:   Past Medical History[1]     Past Surgical History: Past Surgical History[2]    Family Medical History: Family History[3]     Social History:   Social History     Socioeconomic History    Marital status:      Spouse name: Not on file    Number of children: Not on file    Years of education: Not on file    Highest education level: Not on file   Occupational History    Not on file   Tobacco Use    Smoking status: Never     Passive exposure: Never    Smokeless tobacco: Never   Vaping Use    Vaping status: Never Used   Substance and Sexual Activity    Alcohol use: No    Drug use: No    Sexual activity: Not on file   Other Topics Concern    Not on file   Social History Narrative    Not on file     Social Drivers of Health     Food Insecurity: Not on file   Transportation Needs: Not on file   Stress: Not on file   Housing Stability: Not on file        Allergies: Penicillin g and Penicillins     Medications: Current Medications[4]    Review of Systems: Review of Systems   Constitutional: Negative.    HENT: Negative.     Respiratory: Negative.  Negative for shortness of breath, wheezing and stridor.    All other systems reviewed and are negative.      Physical Exam:  /80 (BP Location: Right arm, Patient Position: Sitting, Cuff Size: adult)   Resp 16   Ht 4' 11\"  (1.499 m)   Wt 167 lb (75.8 kg)   BMI 33.73 kg/m²      Constitutional: alert, cooperative. No acute distress.  HEENT: Head NC/AT.    Cardio: Regular rate and rhythm. +murmur  Respiratory: Thorax symmetrical with no labored breathing. Clear to ausculation bilaterally with symmetrical breath sounds. No wheezing, rhonchi, rales, or crackles.   Extremities: No clubbing or cyanosis. No BLE edema.    Neurologic: A&Ox3. No gross motor deficits.  Skin: Warm, dry  Psych: Calm, cooperative. Pleasant affect.    Results:  Personally reviewed  WBC: 5.3, done on 6/30/2025.  HGB: 14.2, done on 6/30/2025.  PLT: 176, done on 6/30/2025.     Glucose: 92, done on 6/30/2025.  Cr: 0.88, done on 6/30/2025.  Last eGFR was 68 on 6/30/2025.  CA: 9.5, done on 6/30/2025.  Na: 141, done on 6/30/2025.  K: 4.1, done on 6/30/2025.  Cl: 105, done on 6/30/2025.  CO2: 28, done on 6/30/2025.  Last ALB was 4.3% done on 6/30/2025.     CT CHEST (CPT=71250)  Result Date: 7/6/2025  CONCLUSION: 1.  Stable 3 mm noncalcified pulmonary nodule right lower lobe. Follow up as per Fleischner criteria is suggested. 2.  Findings concerning for pulmonary arterial hypertension. Clinical correlation recommended. 3.  Large hiatal hernia. 4.  Cholelithiasis. Please see above for further details. Electronically Verified and Signed by Attending Radiologist: Marco Armendariz MD 7/6/2025 11:22 AM Workstation: [id number]       Assessment/Plan:  #1. Lung nodule  Noted incidentally on previous cardiac scan with 3mm RLL nodule  7/2025 CT chest with stable 3mm nodule. No new findings. Enlarged PA at 32mm  No need for further testing or imaging per Fleischner guidelines    #2. Enlarged PA  Noted on CT imaging, suggestive of pulmonary HTN  This could be related to LUCIEN (she denies symptoms) or more likely her underlying cardiac valvular disease - she reports following with cardiologist in [place]  Advised to have f/u echo and revisit with cardiologist. May need right heart cath    #3.  murmur  As above    Shantanu Londono MD  2025         [1]   Past Medical History:   Anal stricture    Arthritis    Family history of colon cancer    Sister    GERD with esophagitis    Hiatal hernia    History of hemorrhoids    History of pneumonia    Hyperlipidemia    Hypothyroidism    Pruritus ani   [2]   Past Surgical History:  Procedure Laterality Date    Anal sphincterotomy  2002          x2    Colonoscopy  2002    Colonoscopy  2012    Mild anal stricture and hemorrhoids    Colonoscopy  2016    Normal colon    Egd  2016    Antral gastropathy with erosive gastritis, mild GERD with esophagitis, large hiatal hernia    Egd  2016    Complete healing of the antrum, large hiatal hernia    Foot surgery  2013    Hysterectomy      Oophorectomy      Removal of ovary(s) Bilateral    [3]   Family History  Problem Relation Age of Onset    Ovarian Cancer Mother 40        30s or 40s    Uterine Cancer Mother 94    Colon Cancer Sister 42    Cancer Sister 30        Liver    Diabetes Other         maternal uncle   [4]   Current Outpatient Medications   Medication Sig Dispense Refill    LEVOTHYROXINE 50 MCG Oral Tab TAKE 1 TABLET(50 MCG) BY MOUTH DAILY 90 tablet 0    atorvastatin 10 MG Oral Tab Take 0.5 tablets (5 mg total) by mouth daily. 45 tablet 0    IRON OR       Probiotic Product (PROBIOTIC DAILY OR) Take by mouth.      Clobetasol Propionate 0.05 % External Cream Apply sparingly to affected area BID  PRN 45 g 1    omega-3 fatty acids 1000 MG Oral Cap       Cholecalciferol (VITAMIN D3 SUPER STRENGTH) 2000 units Oral Tab       nystatin [id number] UNIT/GM External Ointment MARGARITO EXT AA BID (Patient not taking: Reported on 2025)  1

## 2025-07-16 NOTE — PATIENT INSTRUCTIONS
The lung nodule is unchanged and does not require further testing.  The enlarged pulmonary arteries may be related to the heart valve dysfunction and/or sleep apnea. I would suggest you obtain a cardiac echocardiogram and see your heart doctor  Alternatively, if you want to obtain cardiac evaluation at [facility], you can obtain the heart test here and see a cardiologist at [facility] ([facility]) such as Dr. Vasquez, Dr. Guerrero and Dr. Louis or their partners - call [phone] to schedule  Call/message with questions/concerns

## 2025-08-01 ENCOUNTER — TELEPHONE (OUTPATIENT)
Dept: FAMILY MEDICINE CLINIC | Facility: CLINIC | Age: 80
End: 2025-08-01

## 2025-08-13 RX ORDER — ATORVASTATIN CALCIUM 10 MG/1
5 TABLET, FILM COATED ORAL DAILY
Qty: 45 TABLET | Refills: 3 | Status: SHIPPED | OUTPATIENT
Start: 2025-08-13

## 2025-08-21 ENCOUNTER — MED REC SCAN ONLY (OUTPATIENT)
Dept: FAMILY MEDICINE CLINIC | Facility: CLINIC | Age: 80
End: 2025-08-21

## (undated) NOTE — LETTER
Samantha Mary   [address] [place] [address]           Dear Samantha Mary     Our records indicate that you have outstanding lab work and or testing that was ordered for you and has not yet been completed:  Lab Frequency Next Occurrence   CBC W Differential W Platelet [E] Once 12/15/2023   Comp Metabolic Panel (14) [E] Once 12/15/2023   Lipid Panel [E] Once 12/15/2023   TSH and Free T4 [E] Once 12/15/2023      To provide you with the best possible care, please complete these orders at your earliest convenience. If you have recently completed these orders please disregard this letter.     If you have any questions please call the office at [phone].     Thank you,     [facility]